# Patient Record
Sex: FEMALE | Race: WHITE | NOT HISPANIC OR LATINO | Employment: STUDENT | URBAN - METROPOLITAN AREA
[De-identification: names, ages, dates, MRNs, and addresses within clinical notes are randomized per-mention and may not be internally consistent; named-entity substitution may affect disease eponyms.]

---
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
## 2022-03-25 ENCOUNTER — TELEPHONE (OUTPATIENT)
Dept: FAMILY MEDICINE | Facility: CLINIC | Age: 22
End: 2022-03-25
Payer: COMMERCIAL

## 2022-03-25 NOTE — TELEPHONE ENCOUNTER
Left message on voice mail to call back. To confirm appointment on 03/29/2022 @ 2:20 and to bring any mediation bottles that she is taken and that she needs to wear mask in the office.

## 2022-03-29 ENCOUNTER — OFFICE VISIT (OUTPATIENT)
Dept: FAMILY MEDICINE | Facility: CLINIC | Age: 22
End: 2022-03-29
Payer: COMMERCIAL

## 2022-03-29 VITALS
HEART RATE: 84 BPM | WEIGHT: 158 LBS | DIASTOLIC BLOOD PRESSURE: 82 MMHG | SYSTOLIC BLOOD PRESSURE: 136 MMHG | BODY MASS INDEX: 31.85 KG/M2 | HEIGHT: 59 IN | OXYGEN SATURATION: 99 %

## 2022-03-29 DIAGNOSIS — F41.9 ANXIETY: ICD-10-CM

## 2022-03-29 DIAGNOSIS — Z00.00 ROUTINE PHYSICAL EXAMINATION: Primary | ICD-10-CM

## 2022-03-29 DIAGNOSIS — F51.01 PRIMARY INSOMNIA: ICD-10-CM

## 2022-03-29 PROCEDURE — 1160F PR REVIEW ALL MEDS BY PRESCRIBER/CLIN PHARMACIST DOCUMENTED: ICD-10-PCS | Mod: S$GLB,,, | Performed by: PHYSICIAN ASSISTANT

## 2022-03-29 PROCEDURE — 99204 PR OFFICE/OUTPT VISIT, NEW, LEVL IV, 45-59 MIN: ICD-10-PCS | Mod: S$GLB,,, | Performed by: PHYSICIAN ASSISTANT

## 2022-03-29 PROCEDURE — 3008F BODY MASS INDEX DOCD: CPT | Mod: S$GLB,,, | Performed by: PHYSICIAN ASSISTANT

## 2022-03-29 PROCEDURE — 3008F PR BODY MASS INDEX (BMI) DOCUMENTED: ICD-10-PCS | Mod: S$GLB,,, | Performed by: PHYSICIAN ASSISTANT

## 2022-03-29 PROCEDURE — 99204 OFFICE O/P NEW MOD 45 MIN: CPT | Mod: S$GLB,,, | Performed by: PHYSICIAN ASSISTANT

## 2022-03-29 PROCEDURE — 1160F RVW MEDS BY RX/DR IN RCRD: CPT | Mod: S$GLB,,, | Performed by: PHYSICIAN ASSISTANT

## 2022-03-29 RX ORDER — DEXTROAMPHETAMINE SACCHARATE, AMPHETAMINE ASPARTATE, DEXTROAMPHETAMINE SULFATE AND AMPHETAMINE SULFATE 7.5; 7.5; 7.5; 7.5 MG/1; MG/1; MG/1; MG/1
30 TABLET ORAL 2 TIMES DAILY
Refills: 0 | COMMUNITY
Start: 2022-02-10 | End: 2022-05-10 | Stop reason: SDUPTHER

## 2022-03-29 RX ORDER — SUVOREXANT 10 MG/1
10 TABLET, FILM COATED ORAL NIGHTLY
Qty: 10 TABLET | Refills: 0 | Status: SHIPPED | OUTPATIENT
Start: 2022-03-29 | End: 2022-04-08

## 2022-03-29 RX ORDER — ESCITALOPRAM OXALATE 5 MG/1
5 TABLET ORAL DAILY
Qty: 30 TABLET | Refills: 5 | Status: SHIPPED | OUTPATIENT
Start: 2022-03-29 | End: 2022-05-10 | Stop reason: SDUPTHER

## 2022-03-29 RX ORDER — HYDROXYZINE HYDROCHLORIDE 50 MG/1
50 TABLET, FILM COATED ORAL NIGHTLY
COMMUNITY
Start: 2022-02-10 | End: 2022-11-08

## 2022-03-29 NOTE — PROGRESS NOTES
"  SUBJECTIVE:    Patient ID: Nasima Lemus is a 21 y.o. female.    Chief Complaint: Establish Care (New patient to establish care for ADHD and primary care//brought med bottles//tc)    This is a 20 yo wf presenting as a NP to be established with our practice. Previous PCP is pediatrician, Dr. Padron. Her PMH, PSH and FH has been taken and recorded in the chart. She is in school pursuing a degree in psychology. She hopes to proceed through and uktimately her get phD in psych. I see a few basic labs from the past few years UC and ER visits but nothing extensive. She reports doing well overall. Adderall is effective at the current dose. Only issue is sleep. She "cannot shut her mind off" has tried a multitude of sleep medications including trazodone, seroquel, hydroxyzine, clonidine all with little benefit or intolerable for pt. She reports a distant hx of OCD tendencies and has been placed on prozac in the past. Remembers not liking the way it made her feel.       No visits with results within 6 Month(s) from this visit.   Latest known visit with results is:   Admission on 09/20/2021, Discharged on 09/20/2021   Component Date Value Ref Range Status    WBC 09/20/2021 13.77 (A) 3.90 - 12.70 K/uL Final    RBC 09/20/2021 5.33  4.00 - 5.40 M/uL Final    Hemoglobin 09/20/2021 14.6  12.0 - 16.0 g/dL Final    Hematocrit 09/20/2021 44.9  37.0 - 48.5 % Final    MCV 09/20/2021 84  82 - 98 fL Final    MCH 09/20/2021 27.4  27.0 - 31.0 pg Final    MCHC 09/20/2021 32.5  32.0 - 36.0 g/dL Final    RDW 09/20/2021 12.9  11.5 - 14.5 % Final    Platelets 09/20/2021 336  150 - 450 K/uL Final    MPV 09/20/2021 9.3  9.2 - 12.9 fL Final    Immature Granulocytes 09/20/2021 0.3  0.0 - 0.5 % Final    Gran # (ANC) 09/20/2021 12.1 (A) 1.8 - 7.7 K/uL Final    Immature Grans (Abs) 09/20/2021 0.04  0.00 - 0.04 K/uL Final    Lymph # 09/20/2021 1.2  1.0 - 4.8 K/uL Final    Mono # 09/20/2021 0.3  0.3 - 1.0 K/uL Final    Eos # " 09/20/2021 0.0  0.0 - 0.5 K/uL Final    Baso # 09/20/2021 0.03  0.00 - 0.20 K/uL Final    nRBC 09/20/2021 0  0 /100 WBC Final    Gran % 09/20/2021 88.2 (A) 38.0 - 73.0 % Final    Lymph % 09/20/2021 8.6 (A) 18.0 - 48.0 % Final    Mono % 09/20/2021 2.5 (A) 4.0 - 15.0 % Final    Eosinophil % 09/20/2021 0.2  0.0 - 8.0 % Final    Basophil % 09/20/2021 0.2  0.0 - 1.9 % Final    Differential Method 09/20/2021 Automated   Final    Sodium 09/20/2021 137  136 - 145 mmol/L Final    Potassium 09/20/2021 4.2  3.5 - 5.1 mmol/L Final    Chloride 09/20/2021 105  95 - 110 mmol/L Final    CO2 09/20/2021 20 (A) 23 - 29 mmol/L Final    Glucose 09/20/2021 97  70 - 110 mg/dL Final    BUN 09/20/2021 11  6 - 20 mg/dL Final    Creatinine 09/20/2021 0.8  0.5 - 1.4 mg/dL Final    Calcium 09/20/2021 9.7  8.7 - 10.5 mg/dL Final    Anion Gap 09/20/2021 12  8 - 16 mmol/L Final    eGFR if African American 09/20/2021 >60  >60 mL/min/1.73 m^2 Final    eGFR if non African American 09/20/2021 >60  >60 mL/min/1.73 m^2 Final    Specimen UA 09/20/2021 Urine, Clean Catch   Final    Color, UA 09/20/2021 Yellow  Yellow, Straw, Chanel Final    Appearance, UA 09/20/2021 Clear  Clear Final    pH, UA 09/20/2021 6.0  5.0 - 8.0 Final    Specific Gravity, UA 09/20/2021 >=1.030 (A) 1.005 - 1.030 Final    Protein, UA 09/20/2021 Negative  Negative Final    Glucose, UA 09/20/2021 Negative  Negative Final    Ketones, UA 09/20/2021 1+ (A) Negative Final    Bilirubin (UA) 09/20/2021 1+ (A) Negative Final    Occult Blood UA 09/20/2021 Negative  Negative Final    Nitrite, UA 09/20/2021 Negative  Negative Final    Urobilinogen, UA 09/20/2021 Negative  <2.0 EU/dL Final    Leukocytes, UA 09/20/2021 Negative  Negative Final    POC Preg Test, Ur 09/20/2021 Negative  Negative Final     Acceptable 09/20/2021 Yes   Final    Chlamydia, Amplified DNA 09/20/2021 Not Detected  Not Detected Final    N gonorrhoeae, amplified DNA  09/20/2021 Not Detected  Not Detected Final    Trichomonas 09/20/2021 None  None Final    Clue Cells 09/20/2021 None  None Final    Budding Yeast 09/20/2021 None  None Final    Fungal Hyphae 09/20/2021 None  None Final    WBC - Vaginal Screen 09/20/2021 Rare (A) None Final    Bacteria - Vaginal Screen 09/20/2021 None  None Final    Wet Prep Source 09/20/2021 Vagina   Final       Past Medical History:   Diagnosis Date    ADHD (attention deficit hyperactivity disorder)     Anxiety     Depression     Primary insomnia      Past Surgical History:   Procedure Laterality Date    APPENDECTOMY       Family History   Problem Relation Age of Onset    Cancer Mother     Alcohol abuse Father     Depression Father     No Known Problems Sister     Cancer Paternal Grandfather         prostate       Marital Status: Single  Alcohol History:  reports previous alcohol use.  Tobacco History:  reports that she has never smoked. She has never used smokeless tobacco.  Drug History:  reports no history of drug use.    Review of patient's allergies indicates:  No Known Allergies    Current Outpatient Medications:     dextroamphetamine-amphetamine 30 mg Tab, Take 30 mg by mouth 2 (two) times a day., Disp: , Rfl: 0    EScitalopram oxalate (LEXAPRO) 5 MG Tab, Take 1 tablet (5 mg total) by mouth once daily., Disp: 30 tablet, Rfl: 5    ETONOGESTREL-ETHINYL ESTRADIOL VAGL, Place vaginally., Disp: , Rfl:     hydrOXYzine (ATARAX) 50 MG tablet, Take 50 mg by mouth every evening., Disp: , Rfl:     suvorexant (BELSOMRA) 10 mg Tab, Take 10 mg by mouth every evening. for 10 days, Disp: 10 tablet, Rfl: 0    Review of Systems   Constitutional: Negative for appetite change, chills, fatigue, fever and unexpected weight change.   HENT: Negative for congestion.    Respiratory: Negative for cough, chest tightness and shortness of breath.    Cardiovascular: Negative for chest pain and palpitations.   Gastrointestinal: Negative for abdominal  "distention and abdominal pain.   Endocrine: Negative for cold intolerance and heat intolerance.   Genitourinary: Negative for difficulty urinating and dysuria.   Musculoskeletal: Negative for arthralgias and back pain.   Neurological: Negative for dizziness, weakness and headaches.   Psychiatric/Behavioral: Positive for dysphoric mood and sleep disturbance.          Objective:      Vitals:    03/29/22 1406   BP: 136/82   Pulse: 84   SpO2: 99%   Weight: 71.7 kg (158 lb)   Height: 4' 11" (1.499 m)     Physical Exam  Constitutional:       General: She is not in acute distress.     Appearance: She is well-developed.   HENT:      Head: Normocephalic and atraumatic.   Eyes:      Conjunctiva/sclera: Conjunctivae normal.      Pupils: Pupils are equal, round, and reactive to light.   Neck:      Thyroid: No thyromegaly.   Cardiovascular:      Rate and Rhythm: Normal rate and regular rhythm.      Heart sounds: Normal heart sounds.   Pulmonary:      Effort: Pulmonary effort is normal.      Breath sounds: Normal breath sounds.   Abdominal:      General: Bowel sounds are normal. There is no distension.      Palpations: Abdomen is soft.      Tenderness: There is no abdominal tenderness.   Musculoskeletal:         General: Normal range of motion.      Cervical back: Normal range of motion and neck supple.   Skin:     General: Skin is warm and dry.      Findings: No erythema.   Neurological:      Mental Status: She is alert and oriented to person, place, and time.      Cranial Nerves: No cranial nerve deficit.           Assessment:       1. Routine physical examination    2. Primary insomnia    3. Anxiety         Plan:       Routine physical examination  Comments:  very healthy individual. Labs today to get her established. UTD on all screening measures.  Orders:  -     CBC Auto Differential; Future; Expected date: 03/29/2022  -     Comprehensive Metabolic Panel; Future; Expected date: 03/29/2022  -     Lipid Panel; Future; Expected " date: 03/29/2022  -     TSH w/reflex to FT4; Future; Expected date: 03/29/2022  -     Urinalysis, Reflex to Urine Culture Urine, Clean Catch  -     HIV 1/2 Ag/Ab (4th Gen); Future; Expected date: 03/29/2022  -     Hepatitis C Antibody; Future; Expected date: 03/29/2022    Primary insomnia  Comments:  going to trial with belsomra and see how she does. coupon card given.  Orders:  -     suvorexant (BELSOMRA) 10 mg Tab; Take 10 mg by mouth every evening. for 10 days  Dispense: 10 tablet; Refill: 0    Anxiety  Comments:  low dose lexapro to try as SE of prozac were too much for her.  Orders:  -     EScitalopram oxalate (LEXAPRO) 5 MG Tab; Take 1 tablet (5 mg total) by mouth once daily.  Dispense: 30 tablet; Refill: 5      Follow up in about 6 weeks (around 5/10/2022).        3/29/2022 Simone Crews PA-C

## 2022-03-31 ENCOUNTER — TELEPHONE (OUTPATIENT)
Dept: FAMILY MEDICINE | Facility: CLINIC | Age: 22
End: 2022-03-31
Payer: COMMERCIAL

## 2022-03-31 NOTE — TELEPHONE ENCOUNTER
Spoke with pharmacy gave verbal order to change to 30, they stated she will need to call them and give the coupon number to see if it covers the whole box first or if the ins company needs to be billed first.

## 2022-03-31 NOTE — TELEPHONE ENCOUNTER
Lets call and see if they will dispense the box for her. It is coupon card from the company for a free trial. Should be allowed to break or give entire box of 30. If not then PA is fine. She has tried and failed 4 other medications. Clonidine, trazodone, mirtazepine, prozac, hydroxyzine.

## 2022-04-02 LAB
ALBUMIN SERPL-MCNC: 4.1 G/DL (ref 3.6–5.1)
ALBUMIN/GLOB SERPL: 1.6 (CALC) (ref 1–2.5)
ALP SERPL-CCNC: 86 U/L (ref 36–128)
ALT SERPL-CCNC: 13 U/L (ref 5–32)
AST SERPL-CCNC: 14 U/L (ref 12–32)
BASOPHILS # BLD AUTO: 23 CELLS/UL (ref 0–200)
BASOPHILS NFR BLD AUTO: 0.3 %
BILIRUB SERPL-MCNC: 0.7 MG/DL (ref 0.2–1.1)
BUN SERPL-MCNC: 11 MG/DL (ref 7–20)
BUN/CREAT SERPL: NORMAL (CALC) (ref 6–22)
CALCIUM SERPL-MCNC: 8.9 MG/DL (ref 8.9–10.4)
CHLORIDE SERPL-SCNC: 105 MMOL/L (ref 98–110)
CHOLEST SERPL-MCNC: 197 MG/DL
CHOLEST/HDLC SERPL: 3.5 (CALC)
CO2 SERPL-SCNC: 26 MMOL/L (ref 20–32)
CREAT SERPL-MCNC: 0.69 MG/DL (ref 0.5–1)
EOSINOPHIL # BLD AUTO: 146 CELLS/UL (ref 15–500)
EOSINOPHIL NFR BLD AUTO: 1.9 %
ERYTHROCYTE [DISTWIDTH] IN BLOOD BY AUTOMATED COUNT: 13.4 % (ref 11–15)
GLOBULIN SER CALC-MCNC: 2.5 G/DL (CALC) (ref 2–3.8)
GLUCOSE SERPL-MCNC: 84 MG/DL (ref 65–99)
HCT VFR BLD AUTO: 41.7 % (ref 35–45)
HCV AB S/CO SERPL IA: 0
HCV AB SERPL QL IA: NORMAL
HDLC SERPL-MCNC: 57 MG/DL
HGB BLD-MCNC: 13.4 G/DL (ref 11.7–15.5)
HIV 1+2 AB+HIV1 P24 AG SERPL QL IA: NORMAL
LDLC SERPL CALC-MCNC: 110 MG/DL (CALC)
LYMPHOCYTES # BLD AUTO: 2379 CELLS/UL (ref 850–3900)
LYMPHOCYTES NFR BLD AUTO: 30.9 %
MCH RBC QN AUTO: 27.1 PG (ref 27–33)
MCHC RBC AUTO-ENTMCNC: 32.1 G/DL (ref 32–36)
MCV RBC AUTO: 84.2 FL (ref 80–100)
MONOCYTES # BLD AUTO: 647 CELLS/UL (ref 200–950)
MONOCYTES NFR BLD AUTO: 8.4 %
NEUTROPHILS # BLD AUTO: 4505 CELLS/UL (ref 1500–7800)
NEUTROPHILS NFR BLD AUTO: 58.5 %
NONHDLC SERPL-MCNC: 140 MG/DL (CALC)
PLATELET # BLD AUTO: 349 THOUSAND/UL (ref 140–400)
PMV BLD REES-ECKER: 9.4 FL (ref 7.5–12.5)
POTASSIUM SERPL-SCNC: 4.3 MMOL/L (ref 3.8–5.1)
PROT SERPL-MCNC: 6.6 G/DL (ref 6.3–8.2)
RBC # BLD AUTO: 4.95 MILLION/UL (ref 3.8–5.1)
SODIUM SERPL-SCNC: 139 MMOL/L (ref 135–146)
TRIGL SERPL-MCNC: 186 MG/DL
TSH SERPL-ACNC: 2.17 MIU/L
WBC # BLD AUTO: 7.7 THOUSAND/UL (ref 3.8–10.8)

## 2022-04-13 ENCOUNTER — TELEPHONE (OUTPATIENT)
Dept: FAMILY MEDICINE | Facility: CLINIC | Age: 22
End: 2022-04-13

## 2022-04-13 DIAGNOSIS — F51.01 PRIMARY INSOMNIA: Primary | ICD-10-CM

## 2022-04-13 RX ORDER — SUVOREXANT 10 MG/1
1 TABLET, FILM COATED ORAL NIGHTLY PRN
Qty: 30 TABLET | Refills: 5 | Status: SHIPPED | OUTPATIENT
Start: 2022-04-13 | End: 2022-05-10

## 2022-04-13 NOTE — TELEPHONE ENCOUNTER
Pharmacy needs PA on Belsomra, per Jose Martin's last telephone encounter. OK for PA      She has tried and failed 4 other medications. Clonidine, trazodone, mirtazepine, prozac, hydroxyzine.

## 2022-04-13 NOTE — TELEPHONE ENCOUNTER
You can say that she has a contraindication to ambien. And lunesta. These are not safe alternatives for her

## 2022-05-10 ENCOUNTER — OFFICE VISIT (OUTPATIENT)
Dept: FAMILY MEDICINE | Facility: CLINIC | Age: 22
End: 2022-05-10
Payer: COMMERCIAL

## 2022-05-10 VITALS
HEIGHT: 59 IN | DIASTOLIC BLOOD PRESSURE: 78 MMHG | BODY MASS INDEX: 31.65 KG/M2 | HEART RATE: 82 BPM | SYSTOLIC BLOOD PRESSURE: 120 MMHG | WEIGHT: 157 LBS

## 2022-05-10 DIAGNOSIS — F41.9 ANXIETY: ICD-10-CM

## 2022-05-10 DIAGNOSIS — F90.0 ATTENTION DEFICIT HYPERACTIVITY DISORDER (ADHD), PREDOMINANTLY INATTENTIVE TYPE: ICD-10-CM

## 2022-05-10 DIAGNOSIS — Z79.899 ENCOUNTER FOR LONG-TERM (CURRENT) USE OF OTHER MEDICATIONS: ICD-10-CM

## 2022-05-10 DIAGNOSIS — Z51.81 ENCOUNTER FOR THERAPEUTIC DRUG MONITORING: ICD-10-CM

## 2022-05-10 DIAGNOSIS — F51.01 PRIMARY INSOMNIA: Primary | ICD-10-CM

## 2022-05-10 PROCEDURE — 3008F PR BODY MASS INDEX (BMI) DOCUMENTED: ICD-10-PCS | Mod: CPTII,S$GLB,, | Performed by: PHYSICIAN ASSISTANT

## 2022-05-10 PROCEDURE — 3074F SYST BP LT 130 MM HG: CPT | Mod: CPTII,S$GLB,, | Performed by: PHYSICIAN ASSISTANT

## 2022-05-10 PROCEDURE — 3074F PR MOST RECENT SYSTOLIC BLOOD PRESSURE < 130 MM HG: ICD-10-PCS | Mod: CPTII,S$GLB,, | Performed by: PHYSICIAN ASSISTANT

## 2022-05-10 PROCEDURE — 3008F BODY MASS INDEX DOCD: CPT | Mod: CPTII,S$GLB,, | Performed by: PHYSICIAN ASSISTANT

## 2022-05-10 PROCEDURE — 99214 OFFICE O/P EST MOD 30 MIN: CPT | Mod: S$GLB,,, | Performed by: PHYSICIAN ASSISTANT

## 2022-05-10 PROCEDURE — 1159F MED LIST DOCD IN RCRD: CPT | Mod: CPTII,S$GLB,, | Performed by: PHYSICIAN ASSISTANT

## 2022-05-10 PROCEDURE — 1159F PR MEDICATION LIST DOCUMENTED IN MEDICAL RECORD: ICD-10-PCS | Mod: CPTII,S$GLB,, | Performed by: PHYSICIAN ASSISTANT

## 2022-05-10 PROCEDURE — 99214 PR OFFICE/OUTPT VISIT, EST, LEVL IV, 30-39 MIN: ICD-10-PCS | Mod: S$GLB,,, | Performed by: PHYSICIAN ASSISTANT

## 2022-05-10 PROCEDURE — 3078F DIAST BP <80 MM HG: CPT | Mod: CPTII,S$GLB,, | Performed by: PHYSICIAN ASSISTANT

## 2022-05-10 PROCEDURE — 3078F PR MOST RECENT DIASTOLIC BLOOD PRESSURE < 80 MM HG: ICD-10-PCS | Mod: CPTII,S$GLB,, | Performed by: PHYSICIAN ASSISTANT

## 2022-05-10 RX ORDER — DEXTROAMPHETAMINE SACCHARATE, AMPHETAMINE ASPARTATE, DEXTROAMPHETAMINE SULFATE AND AMPHETAMINE SULFATE 7.5; 7.5; 7.5; 7.5 MG/1; MG/1; MG/1; MG/1
30 TABLET ORAL 2 TIMES DAILY
Qty: 60 TABLET | Refills: 0 | Status: SHIPPED | OUTPATIENT
Start: 2022-05-10 | End: 2022-06-09

## 2022-05-10 RX ORDER — ESCITALOPRAM OXALATE 10 MG/1
10 TABLET ORAL DAILY
Qty: 30 TABLET | Refills: 5 | Status: SHIPPED | OUTPATIENT
Start: 2022-05-10 | End: 2022-06-14 | Stop reason: SDUPTHER

## 2022-05-10 RX ORDER — DEXTROAMPHETAMINE SACCHARATE, AMPHETAMINE ASPARTATE, DEXTROAMPHETAMINE SULFATE AND AMPHETAMINE SULFATE 7.5; 7.5; 7.5; 7.5 MG/1; MG/1; MG/1; MG/1
30 TABLET ORAL 2 TIMES DAILY
Qty: 60 TABLET | Refills: 0 | Status: SHIPPED | OUTPATIENT
Start: 2022-06-10 | End: 2022-07-10

## 2022-05-10 RX ORDER — DEXTROAMPHETAMINE SACCHARATE, AMPHETAMINE ASPARTATE, DEXTROAMPHETAMINE SULFATE AND AMPHETAMINE SULFATE 7.5; 7.5; 7.5; 7.5 MG/1; MG/1; MG/1; MG/1
30 TABLET ORAL 2 TIMES DAILY
Qty: 60 TABLET | Refills: 0 | Status: SHIPPED | OUTPATIENT
Start: 2022-07-10 | End: 2022-08-17 | Stop reason: SDUPTHER

## 2022-05-10 RX ORDER — ZOLPIDEM TARTRATE 5 MG/1
5 TABLET ORAL NIGHTLY PRN
Qty: 30 TABLET | Refills: 2 | Status: SHIPPED | OUTPATIENT
Start: 2022-05-10 | End: 2022-06-07

## 2022-05-10 NOTE — PROGRESS NOTES
SUBJECTIVE:    Patient ID: Nasima Lemus is a 21 y.o. female.    Chief Complaint: Follow-up (6 week f/u for medication check//no med bottles//tc)    Very pleasant 21-year-old female presents today for 6 week follow-up regarding medications.  Full labs were completed at the last visit and all stable.  Cholesterol indicated mild elevation of triglycerides.  I did start the patient on low-dose Lexapro and Belsomra. HUGE improvement with the sleep but she was not able to afford the belsomra through insurance. New job and really struggling with the sleep issue. Tired during the day. Does not get more than a few hours sleep each night. Mind racing.       Office Visit on 03/29/2022   Component Date Value Ref Range Status    WBC 03/31/2022 7.7  3.8 - 10.8 Thousand/uL Final    RBC 03/31/2022 4.95  3.80 - 5.10 Million/uL Final    Hemoglobin 03/31/2022 13.4  11.7 - 15.5 g/dL Final    Hematocrit 03/31/2022 41.7  35.0 - 45.0 % Final    MCV 03/31/2022 84.2  80.0 - 100.0 fL Final    MCH 03/31/2022 27.1  27.0 - 33.0 pg Final    MCHC 03/31/2022 32.1  32.0 - 36.0 g/dL Final    RDW 03/31/2022 13.4  11.0 - 15.0 % Final    Platelets 03/31/2022 349  140 - 400 Thousand/uL Final    MPV 03/31/2022 9.4  7.5 - 12.5 fL Final    Neutrophils, Abs 03/31/2022 4,505  1,500 - 7,800 cells/uL Final    Lymph # 03/31/2022 2,379  850 - 3,900 cells/uL Final    Mono # 03/31/2022 647  200 - 950 cells/uL Final    Eos # 03/31/2022 146  15 - 500 cells/uL Final    Baso # 03/31/2022 23  0 - 200 cells/uL Final    Neutrophils Relative 03/31/2022 58.5  % Final    Lymph % 03/31/2022 30.9  % Final    Mono % 03/31/2022 8.4  % Final    Eosinophil % 03/31/2022 1.9  % Final    Basophil % 03/31/2022 0.3  % Final    Glucose 03/31/2022 84  65 - 99 mg/dL Final    BUN 03/31/2022 11  7 - 20 mg/dL Final    Creatinine 03/31/2022 0.69  0.50 - 1.00 mg/dL Final    eGFR if non African American 03/31/2022 126  > OR = 60 mL/min/1.73m2 Final    eGFR if   03/31/2022 146  > OR = 60 mL/min/1.73m2 Final    BUN/Creatinine Ratio 03/31/2022 NOT APPLICABLE  6 - 22 (calc) Final    Sodium 03/31/2022 139  135 - 146 mmol/L Final    Potassium 03/31/2022 4.3  3.8 - 5.1 mmol/L Final    Chloride 03/31/2022 105  98 - 110 mmol/L Final    CO2 03/31/2022 26  20 - 32 mmol/L Final    Calcium 03/31/2022 8.9  8.9 - 10.4 mg/dL Final    Total Protein 03/31/2022 6.6  6.3 - 8.2 g/dL Final    Albumin 03/31/2022 4.1  3.6 - 5.1 g/dL Final    Globulin, Total 03/31/2022 2.5  2.0 - 3.8 g/dL (calc) Final    Albumin/Globulin Ratio 03/31/2022 1.6  1.0 - 2.5 (calc) Final    Total Bilirubin 03/31/2022 0.7  0.2 - 1.1 mg/dL Final    Alkaline Phosphatase 03/31/2022 86  36 - 128 U/L Final    AST 03/31/2022 14  12 - 32 U/L Final    ALT 03/31/2022 13  5 - 32 U/L Final    Cholesterol 03/31/2022 197 (A) <170 mg/dL Final    HDL 03/31/2022 57  >45 mg/dL Final    Triglycerides 03/31/2022 186 (A) <90 mg/dL Final    LDL Cholesterol 03/31/2022 110 (A) <110 mg/dL (calc) Final    HDL/Cholesterol Ratio 03/31/2022 3.5  <5.0 (calc) Final    Non HDL Chol. (LDL+VLDL) 03/31/2022 140 (A) <120 mg/dL (calc) Final    TSH w/reflex to FT4 03/31/2022 2.17  mIU/L Final    HIV Ag/Ab 4th Gen 03/31/2022 NON-REACTIVE  NON-REACTIVE Final    Hepatitis C Ab 03/31/2022 NON-REACTIVE  NON-REACTIVE Final    Signal/Cutoff 03/31/2022 0.00  <1.00 Final       Past Medical History:   Diagnosis Date    ADHD (attention deficit hyperactivity disorder)     Anxiety     Depression     Primary insomnia      Past Surgical History:   Procedure Laterality Date    APPENDECTOMY       Family History   Problem Relation Age of Onset    Cancer Mother     Alcohol abuse Father     Depression Father     No Known Problems Sister     Cancer Paternal Grandfather         prostate       Marital Status: Single  Alcohol History:  reports previous alcohol use.  Tobacco History:  reports that she has never smoked. She has  "never used smokeless tobacco.  Drug History:  reports no history of drug use.    Review of patient's allergies indicates:  No Known Allergies    Current Outpatient Medications:     dextroamphetamine-amphetamine 30 mg Tab, Take 1 tablet (30 mg total) by mouth 2 (two) times a day., Disp: 60 tablet, Rfl: 0    [START ON 6/10/2022] dextroamphetamine-amphetamine 30 mg Tab, Take 1 tablet (30 mg total) by mouth 2 (two) times a day., Disp: 60 tablet, Rfl: 0    [START ON 7/10/2022] dextroamphetamine-amphetamine 30 mg Tab, Take 1 tablet (30 mg total) by mouth 2 (two) times a day., Disp: 60 tablet, Rfl: 0    EScitalopram oxalate (LEXAPRO) 10 MG tablet, Take 1 tablet (10 mg total) by mouth once daily., Disp: 30 tablet, Rfl: 5    ETONOGESTREL-ETHINYL ESTRADIOL VAGL, Place vaginally., Disp: , Rfl:     hydrOXYzine (ATARAX) 50 MG tablet, Take 50 mg by mouth every evening., Disp: , Rfl:     zolpidem (AMBIEN) 5 MG Tab, Take 1 tablet (5 mg total) by mouth nightly as needed (insomnia)., Disp: 30 tablet, Rfl: 2    Review of Systems   Constitutional: Negative for appetite change, chills, fatigue, fever and unexpected weight change.   HENT: Negative for congestion.    Respiratory: Negative for cough, chest tightness and shortness of breath.    Cardiovascular: Negative for chest pain and palpitations.   Gastrointestinal: Negative for abdominal distention and abdominal pain.   Endocrine: Negative for cold intolerance and heat intolerance.   Genitourinary: Negative for difficulty urinating and dysuria.   Musculoskeletal: Negative for arthralgias and back pain.   Neurological: Negative for dizziness, weakness and headaches.   Psychiatric/Behavioral: Positive for dysphoric mood and sleep disturbance.          Objective:      Vitals:    05/10/22 0738   BP: 120/78   Pulse: 82   Weight: 71.2 kg (157 lb)   Height: 4' 11" (1.499 m)     Physical Exam  Constitutional:       General: She is not in acute distress.     Appearance: She is " well-developed.   HENT:      Head: Normocephalic and atraumatic.   Eyes:      Conjunctiva/sclera: Conjunctivae normal.      Pupils: Pupils are equal, round, and reactive to light.   Neck:      Thyroid: No thyromegaly.   Cardiovascular:      Rate and Rhythm: Normal rate and regular rhythm.      Heart sounds: Normal heart sounds.   Pulmonary:      Effort: Pulmonary effort is normal.      Breath sounds: Normal breath sounds.   Abdominal:      General: Bowel sounds are normal. There is no distension.      Palpations: Abdomen is soft.      Tenderness: There is no abdominal tenderness.   Musculoskeletal:         General: Normal range of motion.      Cervical back: Normal range of motion and neck supple.   Skin:     General: Skin is warm and dry.      Findings: No erythema.   Neurological:      Mental Status: She is alert and oriented to person, place, and time.      Cranial Nerves: No cranial nerve deficit.           Assessment:       1. Primary insomnia    2. Encounter for long-term (current) use of other medications    3. Encounter for therapeutic drug monitoring    4. Anxiety    5. Attention deficit hyperactivity disorder (ADHD), predominantly inattentive type         Plan:       Primary insomnia  Comments:  Terrible. Unable to tolerate previous options. Not sleeping well at all. trial with ambien prn.   Orders:  -     zolpidem (AMBIEN) 5 MG Tab; Take 1 tablet (5 mg total) by mouth nightly as needed (insomnia).  Dispense: 30 tablet; Refill: 2    Encounter for long-term (current) use of other medications  -     DRUG MONITOR, PANEL 4, W/CONF, URINE; Future; Expected date: 05/10/2022    Encounter for therapeutic drug monitoring  -     DRUG MONITOR, PANEL 4, W/CONF, URINE; Future; Expected date: 05/10/2022    Anxiety  Comments:  low dose lexapro to try as SE of prozac were too much for her.  Orders:  -     EScitalopram oxalate (LEXAPRO) 10 MG tablet; Take 1 tablet (10 mg total) by mouth once daily.  Dispense: 30 tablet;  Refill: 5    Attention deficit hyperactivity disorder (ADHD), predominantly inattentive type  Comments:  refills of medication now. Doing well. Taking over from another prvider.  Orders:  -     dextroamphetamine-amphetamine 30 mg Tab; Take 1 tablet (30 mg total) by mouth 2 (two) times a day.  Dispense: 60 tablet; Refill: 0  -     dextroamphetamine-amphetamine 30 mg Tab; Take 1 tablet (30 mg total) by mouth 2 (two) times a day.  Dispense: 60 tablet; Refill: 0  -     dextroamphetamine-amphetamine 30 mg Tab; Take 1 tablet (30 mg total) by mouth 2 (two) times a day.  Dispense: 60 tablet; Refill: 0      Follow up in about 3 months (around 8/10/2022).        5/10/2022 Simone Crews PA-C

## 2022-06-07 ENCOUNTER — OFFICE VISIT (OUTPATIENT)
Dept: FAMILY MEDICINE | Facility: CLINIC | Age: 22
End: 2022-06-07
Payer: COMMERCIAL

## 2022-06-07 VITALS
OXYGEN SATURATION: 99 % | DIASTOLIC BLOOD PRESSURE: 80 MMHG | HEART RATE: 70 BPM | SYSTOLIC BLOOD PRESSURE: 116 MMHG | BODY MASS INDEX: 31.32 KG/M2 | WEIGHT: 155.38 LBS | HEIGHT: 59 IN

## 2022-06-07 DIAGNOSIS — F51.01 PRIMARY INSOMNIA: Primary | ICD-10-CM

## 2022-06-07 DIAGNOSIS — H66.91 RIGHT OTITIS MEDIA, UNSPECIFIED OTITIS MEDIA TYPE: ICD-10-CM

## 2022-06-07 PROCEDURE — 99213 PR OFFICE/OUTPT VISIT, EST, LEVL III, 20-29 MIN: ICD-10-PCS | Mod: S$GLB,,, | Performed by: PHYSICIAN ASSISTANT

## 2022-06-07 PROCEDURE — 99213 OFFICE O/P EST LOW 20 MIN: CPT | Mod: S$GLB,,, | Performed by: PHYSICIAN ASSISTANT

## 2022-06-07 PROCEDURE — 1159F MED LIST DOCD IN RCRD: CPT | Mod: CPTII,S$GLB,, | Performed by: PHYSICIAN ASSISTANT

## 2022-06-07 PROCEDURE — 3074F SYST BP LT 130 MM HG: CPT | Mod: CPTII,S$GLB,, | Performed by: PHYSICIAN ASSISTANT

## 2022-06-07 PROCEDURE — 1159F PR MEDICATION LIST DOCUMENTED IN MEDICAL RECORD: ICD-10-PCS | Mod: CPTII,S$GLB,, | Performed by: PHYSICIAN ASSISTANT

## 2022-06-07 PROCEDURE — 3008F PR BODY MASS INDEX (BMI) DOCUMENTED: ICD-10-PCS | Mod: CPTII,S$GLB,, | Performed by: PHYSICIAN ASSISTANT

## 2022-06-07 PROCEDURE — 3074F PR MOST RECENT SYSTOLIC BLOOD PRESSURE < 130 MM HG: ICD-10-PCS | Mod: CPTII,S$GLB,, | Performed by: PHYSICIAN ASSISTANT

## 2022-06-07 PROCEDURE — 3079F PR MOST RECENT DIASTOLIC BLOOD PRESSURE 80-89 MM HG: ICD-10-PCS | Mod: CPTII,S$GLB,, | Performed by: PHYSICIAN ASSISTANT

## 2022-06-07 PROCEDURE — 3079F DIAST BP 80-89 MM HG: CPT | Mod: CPTII,S$GLB,, | Performed by: PHYSICIAN ASSISTANT

## 2022-06-07 PROCEDURE — 3008F BODY MASS INDEX DOCD: CPT | Mod: CPTII,S$GLB,, | Performed by: PHYSICIAN ASSISTANT

## 2022-06-07 RX ORDER — AMOXICILLIN AND CLAVULANATE POTASSIUM 875; 125 MG/1; MG/1
1 TABLET, FILM COATED ORAL EVERY 12 HOURS
Qty: 20 TABLET | Refills: 0 | Status: SHIPPED | OUTPATIENT
Start: 2022-06-07 | End: 2022-06-17

## 2022-06-07 RX ORDER — SUVOREXANT 10 MG/1
10 TABLET, FILM COATED ORAL NIGHTLY
Qty: 30 TABLET | Refills: 5 | Status: SHIPPED | OUTPATIENT
Start: 2022-06-07 | End: 2022-08-23 | Stop reason: SDUPTHER

## 2022-06-07 NOTE — PROGRESS NOTES
SUBJECTIVE:    Patient ID: Nasima Lemus is a 21 y.o. female.    Chief Complaint: Otalgia (No bottles/ right ear pain x 4 days//dp)    This is a 20 yo wf presenting today for urgent evaluation of RT ear pain. 4 days in duration. Denies any fever/chills. Denies drainage from the ear but has seen some reduced hearing. Feels like she has headphones on at all times. Muffled. Was seen at the  and prescribed abx but they did not end up sending them she says. Feels like a knife in the ear.      Office Visit on 03/29/2022   Component Date Value Ref Range Status    WBC 03/31/2022 7.7  3.8 - 10.8 Thousand/uL Final    RBC 03/31/2022 4.95  3.80 - 5.10 Million/uL Final    Hemoglobin 03/31/2022 13.4  11.7 - 15.5 g/dL Final    Hematocrit 03/31/2022 41.7  35.0 - 45.0 % Final    MCV 03/31/2022 84.2  80.0 - 100.0 fL Final    MCH 03/31/2022 27.1  27.0 - 33.0 pg Final    MCHC 03/31/2022 32.1  32.0 - 36.0 g/dL Final    RDW 03/31/2022 13.4  11.0 - 15.0 % Final    Platelets 03/31/2022 349  140 - 400 Thousand/uL Final    MPV 03/31/2022 9.4  7.5 - 12.5 fL Final    Neutrophils, Abs 03/31/2022 4,505  1,500 - 7,800 cells/uL Final    Lymph # 03/31/2022 2,379  850 - 3,900 cells/uL Final    Mono # 03/31/2022 647  200 - 950 cells/uL Final    Eos # 03/31/2022 146  15 - 500 cells/uL Final    Baso # 03/31/2022 23  0 - 200 cells/uL Final    Neutrophils Relative 03/31/2022 58.5  % Final    Lymph % 03/31/2022 30.9  % Final    Mono % 03/31/2022 8.4  % Final    Eosinophil % 03/31/2022 1.9  % Final    Basophil % 03/31/2022 0.3  % Final    Glucose 03/31/2022 84  65 - 99 mg/dL Final    BUN 03/31/2022 11  7 - 20 mg/dL Final    Creatinine 03/31/2022 0.69  0.50 - 1.00 mg/dL Final    eGFR if non African American 03/31/2022 126  > OR = 60 mL/min/1.73m2 Final    eGFR if  03/31/2022 146  > OR = 60 mL/min/1.73m2 Final    BUN/Creatinine Ratio 03/31/2022 NOT APPLICABLE  6 - 22 (calc) Final    Sodium 03/31/2022 139  135  - 146 mmol/L Final    Potassium 03/31/2022 4.3  3.8 - 5.1 mmol/L Final    Chloride 03/31/2022 105  98 - 110 mmol/L Final    CO2 03/31/2022 26  20 - 32 mmol/L Final    Calcium 03/31/2022 8.9  8.9 - 10.4 mg/dL Final    Total Protein 03/31/2022 6.6  6.3 - 8.2 g/dL Final    Albumin 03/31/2022 4.1  3.6 - 5.1 g/dL Final    Globulin, Total 03/31/2022 2.5  2.0 - 3.8 g/dL (calc) Final    Albumin/Globulin Ratio 03/31/2022 1.6  1.0 - 2.5 (calc) Final    Total Bilirubin 03/31/2022 0.7  0.2 - 1.1 mg/dL Final    Alkaline Phosphatase 03/31/2022 86  36 - 128 U/L Final    AST 03/31/2022 14  12 - 32 U/L Final    ALT 03/31/2022 13  5 - 32 U/L Final    Cholesterol 03/31/2022 197 (A) <170 mg/dL Final    HDL 03/31/2022 57  >45 mg/dL Final    Triglycerides 03/31/2022 186 (A) <90 mg/dL Final    LDL Cholesterol 03/31/2022 110 (A) <110 mg/dL (calc) Final    HDL/Cholesterol Ratio 03/31/2022 3.5  <5.0 (calc) Final    Non HDL Chol. (LDL+VLDL) 03/31/2022 140 (A) <120 mg/dL (calc) Final    TSH w/reflex to FT4 03/31/2022 2.17  mIU/L Final    HIV Ag/Ab 4th Gen 03/31/2022 NON-REACTIVE  NON-REACTIVE Final    Hepatitis C Ab 03/31/2022 NON-REACTIVE  NON-REACTIVE Final    Signal/Cutoff 03/31/2022 0.00  <1.00 Final       Past Medical History:   Diagnosis Date    ADHD (attention deficit hyperactivity disorder)     Anxiety     Depression     Primary insomnia      Past Surgical History:   Procedure Laterality Date    APPENDECTOMY       Family History   Problem Relation Age of Onset    Cancer Mother     Alcohol abuse Father     Depression Father     No Known Problems Sister     Cancer Paternal Grandfather         prostate       Marital Status: Single  Alcohol History:  reports previous alcohol use.  Tobacco History:  reports that she has never smoked. She has never used smokeless tobacco.  Drug History:  reports no history of drug use.    Review of patient's allergies indicates:  No Known Allergies    Current Outpatient  "Medications:     dextroamphetamine-amphetamine 30 mg Tab, Take 1 tablet (30 mg total) by mouth 2 (two) times a day., Disp: 60 tablet, Rfl: 0    [START ON 6/10/2022] dextroamphetamine-amphetamine 30 mg Tab, Take 1 tablet (30 mg total) by mouth 2 (two) times a day., Disp: 60 tablet, Rfl: 0    [START ON 7/10/2022] dextroamphetamine-amphetamine 30 mg Tab, Take 1 tablet (30 mg total) by mouth 2 (two) times a day., Disp: 60 tablet, Rfl: 0    EScitalopram oxalate (LEXAPRO) 10 MG tablet, Take 1 tablet (10 mg total) by mouth once daily., Disp: 30 tablet, Rfl: 5    ETONOGESTREL-ETHINYL ESTRADIOL VAGL, Place vaginally., Disp: , Rfl:     hydrOXYzine (ATARAX) 50 MG tablet, Take 50 mg by mouth every evening., Disp: , Rfl:     amoxicillin-clavulanate 875-125mg (AUGMENTIN) 875-125 mg per tablet, Take 1 tablet by mouth every 12 (twelve) hours. for 10 days, Disp: 20 tablet, Rfl: 0    suvorexant (BELSOMRA) 10 mg Tab, Take 10 mg by mouth every evening., Disp: 30 tablet, Rfl: 5    Review of Systems   HENT: Positive for congestion, ear pain, hearing loss (RT sided acute) and tinnitus. Negative for facial swelling.           Objective:      Vitals:    06/07/22 1204   BP: 116/80   Pulse: 70   SpO2: 99%   Weight: 70.5 kg (155 lb 6.4 oz)   Height: 4' 11" (1.499 m)     Physical Exam  Constitutional:       General: She is not in acute distress.     Appearance: She is well-developed.   HENT:      Head: Normocephalic and atraumatic.      Right Ear: Decreased hearing noted. Swelling present. Tympanic membrane is erythematous and bulging.   Eyes:      Conjunctiva/sclera: Conjunctivae normal.      Pupils: Pupils are equal, round, and reactive to light.   Neck:      Thyroid: No thyromegaly.   Cardiovascular:      Rate and Rhythm: Normal rate and regular rhythm.      Heart sounds: Normal heart sounds.   Pulmonary:      Effort: Pulmonary effort is normal.      Breath sounds: Normal breath sounds.   Abdominal:      General: Bowel sounds are " normal. There is no distension.      Palpations: Abdomen is soft.      Tenderness: There is no abdominal tenderness.   Musculoskeletal:         General: Normal range of motion.      Cervical back: Normal range of motion and neck supple.   Skin:     General: Skin is warm and dry.      Findings: No erythema.   Neurological:      Mental Status: She is alert and oriented to person, place, and time.      Cranial Nerves: No cranial nerve deficit.           Assessment:       1. Primary insomnia    2. Right otitis media, unspecified otitis media type         Plan:       Primary insomnia  Comments:  New insurance. Going to see if we can get the belsomra covered. erx sent.  Orders:  -     suvorexant (BELSOMRA) 10 mg Tab; Take 10 mg by mouth every evening.  Dispense: 30 tablet; Refill: 5    Right otitis media, unspecified otitis media type  Comments:  Cover with augmentin now. she will continue as is with prednisone. If sxs are worsening she will call  Orders:  -     amoxicillin-clavulanate 875-125mg (AUGMENTIN) 875-125 mg per tablet; Take 1 tablet by mouth every 12 (twelve) hours. for 10 days  Dispense: 20 tablet; Refill: 0      Follow up if symptoms worsen or fail to improve, for as scheduled.        6/7/2022 Simone Crews PA-C

## 2022-06-09 ENCOUNTER — PATIENT MESSAGE (OUTPATIENT)
Dept: FAMILY MEDICINE | Facility: CLINIC | Age: 22
End: 2022-06-09

## 2022-06-14 DIAGNOSIS — F41.9 ANXIETY: ICD-10-CM

## 2022-06-15 ENCOUNTER — PATIENT MESSAGE (OUTPATIENT)
Dept: FAMILY MEDICINE | Facility: CLINIC | Age: 22
End: 2022-06-15

## 2022-06-15 ENCOUNTER — TELEPHONE (OUTPATIENT)
Dept: FAMILY MEDICINE | Facility: CLINIC | Age: 22
End: 2022-06-15

## 2022-06-15 RX ORDER — ESCITALOPRAM OXALATE 10 MG/1
10 TABLET ORAL DAILY
Qty: 30 TABLET | Refills: 5 | Status: SHIPPED | OUTPATIENT
Start: 2022-06-15 | End: 2022-08-23 | Stop reason: SDUPTHER

## 2022-06-15 NOTE — TELEPHONE ENCOUNTER
Recommend starting Flonase daily 1 spray each nostril and finish out antibiotics.  If she continues with symptoms after antibiotics may need to see ENT

## 2022-08-23 DIAGNOSIS — F51.01 PRIMARY INSOMNIA: ICD-10-CM

## 2022-08-23 DIAGNOSIS — F41.9 ANXIETY: ICD-10-CM

## 2022-08-23 RX ORDER — ESCITALOPRAM OXALATE 10 MG/1
10 TABLET ORAL DAILY
Qty: 30 TABLET | Refills: 5 | Status: SHIPPED | OUTPATIENT
Start: 2022-08-23 | End: 2022-08-29 | Stop reason: SDUPTHER

## 2022-08-23 RX ORDER — SUVOREXANT 10 MG/1
10 TABLET, FILM COATED ORAL NIGHTLY
Qty: 30 TABLET | Refills: 5 | Status: SHIPPED | OUTPATIENT
Start: 2022-08-23 | End: 2022-08-29 | Stop reason: SDUPTHER

## 2022-08-29 DIAGNOSIS — F41.9 ANXIETY: ICD-10-CM

## 2022-08-29 DIAGNOSIS — F90.0 ATTENTION DEFICIT HYPERACTIVITY DISORDER (ADHD), PREDOMINANTLY INATTENTIVE TYPE: ICD-10-CM

## 2022-08-29 DIAGNOSIS — F51.01 PRIMARY INSOMNIA: ICD-10-CM

## 2022-08-29 RX ORDER — DEXTROAMPHETAMINE SACCHARATE, AMPHETAMINE ASPARTATE, DEXTROAMPHETAMINE SULFATE AND AMPHETAMINE SULFATE 7.5; 7.5; 7.5; 7.5 MG/1; MG/1; MG/1; MG/1
30 TABLET ORAL 2 TIMES DAILY
Qty: 60 TABLET | Refills: 0 | Status: SHIPPED | OUTPATIENT
Start: 2022-09-15 | End: 2022-10-15

## 2022-08-29 RX ORDER — ESCITALOPRAM OXALATE 10 MG/1
10 TABLET ORAL DAILY
Qty: 30 TABLET | Refills: 5 | Status: SHIPPED | OUTPATIENT
Start: 2022-08-29 | End: 2023-07-04 | Stop reason: SDUPTHER

## 2022-08-29 RX ORDER — SUVOREXANT 10 MG/1
10 TABLET, FILM COATED ORAL NIGHTLY
Qty: 30 TABLET | Refills: 5 | Status: SHIPPED | OUTPATIENT
Start: 2022-08-29 | End: 2023-02-25

## 2022-08-29 RX ORDER — DEXTROAMPHETAMINE SACCHARATE, AMPHETAMINE ASPARTATE, DEXTROAMPHETAMINE SULFATE AND AMPHETAMINE SULFATE 7.5; 7.5; 7.5; 7.5 MG/1; MG/1; MG/1; MG/1
30 TABLET ORAL 2 TIMES DAILY
Qty: 60 TABLET | Refills: 0 | Status: SHIPPED | OUTPATIENT
Start: 2022-10-15 | End: 2022-11-08 | Stop reason: SDUPTHER

## 2022-09-13 ENCOUNTER — OFFICE VISIT (OUTPATIENT)
Dept: FAMILY MEDICINE | Facility: CLINIC | Age: 22
End: 2022-09-13
Payer: COMMERCIAL

## 2022-09-13 VITALS
HEIGHT: 59 IN | OXYGEN SATURATION: 99 % | SYSTOLIC BLOOD PRESSURE: 142 MMHG | BODY MASS INDEX: 31.65 KG/M2 | HEART RATE: 110 BPM | WEIGHT: 157 LBS | DIASTOLIC BLOOD PRESSURE: 82 MMHG

## 2022-09-13 DIAGNOSIS — I10 ESSENTIAL HYPERTENSION: Primary | ICD-10-CM

## 2022-09-13 PROCEDURE — 3079F DIAST BP 80-89 MM HG: CPT | Mod: CPTII,S$GLB,, | Performed by: PHYSICIAN ASSISTANT

## 2022-09-13 PROCEDURE — 99213 PR OFFICE/OUTPT VISIT, EST, LEVL III, 20-29 MIN: ICD-10-PCS | Mod: S$GLB,,, | Performed by: PHYSICIAN ASSISTANT

## 2022-09-13 PROCEDURE — 1159F PR MEDICATION LIST DOCUMENTED IN MEDICAL RECORD: ICD-10-PCS | Mod: CPTII,S$GLB,, | Performed by: PHYSICIAN ASSISTANT

## 2022-09-13 PROCEDURE — 1159F MED LIST DOCD IN RCRD: CPT | Mod: CPTII,S$GLB,, | Performed by: PHYSICIAN ASSISTANT

## 2022-09-13 PROCEDURE — 3077F PR MOST RECENT SYSTOLIC BLOOD PRESSURE >= 140 MM HG: ICD-10-PCS | Mod: CPTII,S$GLB,, | Performed by: PHYSICIAN ASSISTANT

## 2022-09-13 PROCEDURE — 3079F PR MOST RECENT DIASTOLIC BLOOD PRESSURE 80-89 MM HG: ICD-10-PCS | Mod: CPTII,S$GLB,, | Performed by: PHYSICIAN ASSISTANT

## 2022-09-13 PROCEDURE — 3077F SYST BP >= 140 MM HG: CPT | Mod: CPTII,S$GLB,, | Performed by: PHYSICIAN ASSISTANT

## 2022-09-13 PROCEDURE — 99213 OFFICE O/P EST LOW 20 MIN: CPT | Mod: S$GLB,,, | Performed by: PHYSICIAN ASSISTANT

## 2022-09-13 PROCEDURE — 3008F PR BODY MASS INDEX (BMI) DOCUMENTED: ICD-10-PCS | Mod: CPTII,S$GLB,, | Performed by: PHYSICIAN ASSISTANT

## 2022-09-13 PROCEDURE — 3008F BODY MASS INDEX DOCD: CPT | Mod: CPTII,S$GLB,, | Performed by: PHYSICIAN ASSISTANT

## 2022-09-13 RX ORDER — LISINOPRIL 5 MG/1
5 TABLET ORAL DAILY
Qty: 30 TABLET | Refills: 2 | Status: SHIPPED | OUTPATIENT
Start: 2022-09-13 | End: 2022-10-05 | Stop reason: SDUPTHER

## 2022-09-13 NOTE — PROGRESS NOTES
SUBJECTIVE:    Patient ID: Nasima Lemus is a 21 y.o. female.    Chief Complaint: Follow-up (6 mo f/u//no med bottles//tc)    This is a 21-year-old female who presents today for 6 month follow-up.  She has noted recently pressure running a bit high at home.  150/110 on her at-home monitor.  She is treated for ADHD with Adderall effectively.  Never had a problem with pressure prior.  Has been on this for years.  She manages with the belsomra for sleep and Lexapro for anxiety/depression.  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 Occasional headache and flushing. Denies dizziness, blurry vision etc.      Office Visit on 03/29/2022   Component Date Value Ref Range Status    WBC 03/31/2022 7.7  3.8 - 10.8 Thousand/uL Final    RBC 03/31/2022 4.95  3.80 - 5.10 Million/uL Final    Hemoglobin 03/31/2022 13.4  11.7 - 15.5 g/dL Final    Hematocrit 03/31/2022 41.7  35.0 - 45.0 % Final    MCV 03/31/2022 84.2  80.0 - 100.0 fL Final    MCH 03/31/2022 27.1  27.0 - 33.0 pg Final    MCHC 03/31/2022 32.1  32.0 - 36.0 g/dL Final    RDW 03/31/2022 13.4  11.0 - 15.0 % Final    Platelets 03/31/2022 349  140 - 400 Thousand/uL Final    MPV 03/31/2022 9.4  7.5 - 12.5 fL Final    Neutrophils, Abs 03/31/2022 4,505  1,500 - 7,800 cells/uL Final    Lymph # 03/31/2022 2,379  850 - 3,900 cells/uL Final    Mono # 03/31/2022 647  200 - 950 cells/uL Final    Eos # 03/31/2022 146  15 - 500 cells/uL Final    Baso # 03/31/2022 23  0 - 200 cells/uL Final    Neutrophils Relative 03/31/2022 58.5  % Final    Lymph % 03/31/2022 30.9  % Final    Mono % 03/31/2022 8.4  % Final    Eosinophil % 03/31/2022 1.9  % Final    Basophil % 03/31/2022 0.3  % Final    Glucose 03/31/2022 84  65 - 99 mg/dL Final    BUN 03/31/2022 11  7 - 20 mg/dL Final    Creatinine 03/31/2022 0.69  0.50 - 1.00 mg/dL Final    eGFR if non African American 03/31/2022 126  > OR = 60 mL/min/1.73m2 Final    eGFR if  03/31/2022  146  > OR = 60 mL/min/1.73m2 Final    BUN/Creatinine Ratio 03/31/2022 NOT APPLICABLE  6 - 22 (calc) Final    Sodium 03/31/2022 139  135 - 146 mmol/L Final    Potassium 03/31/2022 4.3  3.8 - 5.1 mmol/L Final    Chloride 03/31/2022 105  98 - 110 mmol/L Final    CO2 03/31/2022 26  20 - 32 mmol/L Final    Calcium 03/31/2022 8.9  8.9 - 10.4 mg/dL Final    Total Protein 03/31/2022 6.6  6.3 - 8.2 g/dL Final    Albumin 03/31/2022 4.1  3.6 - 5.1 g/dL Final    Globulin, Total 03/31/2022 2.5  2.0 - 3.8 g/dL (calc) Final    Albumin/Globulin Ratio 03/31/2022 1.6  1.0 - 2.5 (calc) Final    Total Bilirubin 03/31/2022 0.7  0.2 - 1.1 mg/dL Final    Alkaline Phosphatase 03/31/2022 86  36 - 128 U/L Final    AST 03/31/2022 14  12 - 32 U/L Final    ALT 03/31/2022 13  5 - 32 U/L Final    Cholesterol 03/31/2022 197 (H)  <170 mg/dL Final    HDL 03/31/2022 57  >45 mg/dL Final    Triglycerides 03/31/2022 186 (H)  <90 mg/dL Final    LDL Cholesterol 03/31/2022 110 (H)  <110 mg/dL (calc) Final    HDL/Cholesterol Ratio 03/31/2022 3.5  <5.0 (calc) Final    Non HDL Chol. (LDL+VLDL) 03/31/2022 140 (H)  <120 mg/dL (calc) Final    TSH w/reflex to FT4 03/31/2022 2.17  mIU/L Final    HIV Ag/Ab 4th Gen 03/31/2022 NON-REACTIVE  NON-REACTIVE Final    Hepatitis C Ab 03/31/2022 NON-REACTIVE  NON-REACTIVE Final    Signal/Cutoff 03/31/2022 0.00  <1.00 Final       Past Medical History:   Diagnosis Date    ADHD (attention deficit hyperactivity disorder)     Anxiety     Depression     Primary insomnia      Past Surgical History:   Procedure Laterality Date    APPENDECTOMY       Family History   Problem Relation Age of Onset    Cancer Mother     Alcohol abuse Father     Depression Father     No Known Problems Sister     Cancer Paternal Grandfather         prostate       Marital Status: Single  Alcohol History:  reports that she does not currently use alcohol.  Tobacco History:  reports that she has never smoked. She has never used smokeless tobacco.  Drug  "History:  reports no history of drug use.    Review of patient's allergies indicates:  No Known Allergies    Current Outpatient Medications:     [START ON 10/15/2022] dextroamphetamine-amphetamine 30 mg Tab, Take 1 tablet (30 mg total) by mouth 2 (two) times a day., Disp: 60 tablet, Rfl: 0    EScitalopram oxalate (LEXAPRO) 10 MG tablet, Take 1 tablet (10 mg total) by mouth once daily., Disp: 30 tablet, Rfl: 5    ETONOGESTREL-ETHINYL ESTRADIOL VAGL, Place vaginally., Disp: , Rfl:     suvorexant (BELSOMRA) 10 mg Tab, Take 10 mg by mouth every evening., Disp: 30 tablet, Rfl: 5    [START ON 9/15/2022] dextroamphetamine-amphetamine 30 mg Tab, Take 1 tablet (30 mg total) by mouth 2 (two) times a day., Disp: 60 tablet, Rfl: 0    hydrOXYzine (ATARAX) 50 MG tablet, Take 50 mg by mouth every evening., Disp: , Rfl:     lisinopriL (PRINIVIL,ZESTRIL) 5 MG tablet, Take 1 tablet (5 mg total) by mouth once daily., Disp: 30 tablet, Rfl: 2    Review of Systems   Constitutional:  Negative for appetite change, chills, fatigue, fever and unexpected weight change.   HENT:  Negative for congestion.    Respiratory:  Negative for cough, chest tightness and shortness of breath.    Cardiovascular:  Negative for chest pain and palpitations.   Gastrointestinal:  Negative for abdominal distention and abdominal pain.   Endocrine: Negative for cold intolerance and heat intolerance.   Genitourinary:  Negative for difficulty urinating and dysuria.   Musculoskeletal:  Negative for arthralgias and back pain.   Neurological:  Negative for dizziness, weakness and headaches.        Objective:      Vitals:    09/13/22 1343   BP: (!) 142/82   Pulse: 110   SpO2: 99%   Weight: 71.2 kg (157 lb)   Height: 4' 11" (1.499 m)     Physical Exam  Constitutional:       General: She is not in acute distress.     Appearance: She is well-developed.   HENT:      Head: Normocephalic and atraumatic.   Eyes:      Conjunctiva/sclera: Conjunctivae normal.      Pupils: Pupils " are equal, round, and reactive to light.   Neck:      Thyroid: No thyromegaly.   Cardiovascular:      Rate and Rhythm: Normal rate and regular rhythm.      Heart sounds: Normal heart sounds.   Pulmonary:      Effort: Pulmonary effort is normal.      Breath sounds: Normal breath sounds.   Abdominal:      General: Bowel sounds are normal. There is no distension.      Palpations: Abdomen is soft.      Tenderness: There is no abdominal tenderness.   Musculoskeletal:         General: Normal range of motion.      Cervical back: Normal range of motion and neck supple.   Skin:     General: Skin is warm and dry.      Findings: No erythema.   Neurological:      Mental Status: She is alert and oriented to person, place, and time.      Cranial Nerves: No cranial nerve deficit.         Assessment:       1. Essential hypertension         Plan:       Essential hypertension  Comments:  will start lisinopril 5mg. keep log closely at home. and will update me in one week. f/u in person in 3-4 weeks.  Orders:  -     lisinopriL (PRINIVIL,ZESTRIL) 5 MG tablet; Take 1 tablet (5 mg total) by mouth once daily.  Dispense: 30 tablet; Refill: 2    Follow up in about 4 weeks (around 10/11/2022) for BP Check-Up.        9/13/2022 Simone Crews PA-C

## 2022-09-26 ENCOUNTER — PATIENT MESSAGE (OUTPATIENT)
Dept: FAMILY MEDICINE | Facility: CLINIC | Age: 22
End: 2022-09-26

## 2022-10-05 ENCOUNTER — PATIENT MESSAGE (OUTPATIENT)
Dept: FAMILY MEDICINE | Facility: CLINIC | Age: 22
End: 2022-10-05

## 2022-10-05 DIAGNOSIS — I10 ESSENTIAL HYPERTENSION: ICD-10-CM

## 2022-10-05 RX ORDER — LISINOPRIL 5 MG/1
5 TABLET ORAL 2 TIMES DAILY
Qty: 60 TABLET | Refills: 2 | Status: SHIPPED | OUTPATIENT
Start: 2022-10-05 | End: 2022-10-13

## 2022-10-13 ENCOUNTER — OFFICE VISIT (OUTPATIENT)
Dept: FAMILY MEDICINE | Facility: CLINIC | Age: 22
End: 2022-10-13
Payer: COMMERCIAL

## 2022-10-13 VITALS
DIASTOLIC BLOOD PRESSURE: 89 MMHG | OXYGEN SATURATION: 98 % | HEART RATE: 108 BPM | BODY MASS INDEX: 31.45 KG/M2 | WEIGHT: 156 LBS | SYSTOLIC BLOOD PRESSURE: 130 MMHG | HEIGHT: 59 IN

## 2022-10-13 DIAGNOSIS — Z51.81 ENCOUNTER FOR THERAPEUTIC DRUG MONITORING: ICD-10-CM

## 2022-10-13 DIAGNOSIS — Z79.899 ENCOUNTER FOR LONG-TERM (CURRENT) USE OF OTHER MEDICATIONS: ICD-10-CM

## 2022-10-13 DIAGNOSIS — I10 ESSENTIAL HYPERTENSION: Primary | ICD-10-CM

## 2022-10-13 DIAGNOSIS — R00.2 PALPITATIONS: ICD-10-CM

## 2022-10-13 DIAGNOSIS — H81.10 BENIGN PAROXYSMAL POSITIONAL VERTIGO, UNSPECIFIED LATERALITY: ICD-10-CM

## 2022-10-13 LAB
EKG 12-LEAD: NORMAL
PR INTERVAL: NORMAL
PRT AXES: NORMAL
QRS DURATION: NORMAL
QT/QTC: NORMAL
VENTRICULAR RATE: NORMAL

## 2022-10-13 PROCEDURE — 4010F PR ACE/ARB THEARPY RXD/TAKEN: ICD-10-PCS | Mod: CPTII,S$GLB,, | Performed by: PHYSICIAN ASSISTANT

## 2022-10-13 PROCEDURE — 3075F SYST BP GE 130 - 139MM HG: CPT | Mod: CPTII,S$GLB,, | Performed by: PHYSICIAN ASSISTANT

## 2022-10-13 PROCEDURE — 99214 OFFICE O/P EST MOD 30 MIN: CPT | Mod: 25,S$GLB,, | Performed by: PHYSICIAN ASSISTANT

## 2022-10-13 PROCEDURE — 3075F PR MOST RECENT SYSTOLIC BLOOD PRESS GE 130-139MM HG: ICD-10-PCS | Mod: CPTII,S$GLB,, | Performed by: PHYSICIAN ASSISTANT

## 2022-10-13 PROCEDURE — 1159F MED LIST DOCD IN RCRD: CPT | Mod: CPTII,S$GLB,, | Performed by: PHYSICIAN ASSISTANT

## 2022-10-13 PROCEDURE — 3079F DIAST BP 80-89 MM HG: CPT | Mod: CPTII,S$GLB,, | Performed by: PHYSICIAN ASSISTANT

## 2022-10-13 PROCEDURE — 93000 POCT EKG 12-LEAD: ICD-10-PCS | Mod: S$GLB,,, | Performed by: PHYSICIAN ASSISTANT

## 2022-10-13 PROCEDURE — 4010F ACE/ARB THERAPY RXD/TAKEN: CPT | Mod: CPTII,S$GLB,, | Performed by: PHYSICIAN ASSISTANT

## 2022-10-13 PROCEDURE — 99214 PR OFFICE/OUTPT VISIT, EST, LEVL IV, 30-39 MIN: ICD-10-PCS | Mod: 25,S$GLB,, | Performed by: PHYSICIAN ASSISTANT

## 2022-10-13 PROCEDURE — 93000 ELECTROCARDIOGRAM COMPLETE: CPT | Mod: S$GLB,,, | Performed by: PHYSICIAN ASSISTANT

## 2022-10-13 PROCEDURE — 1159F PR MEDICATION LIST DOCUMENTED IN MEDICAL RECORD: ICD-10-PCS | Mod: CPTII,S$GLB,, | Performed by: PHYSICIAN ASSISTANT

## 2022-10-13 PROCEDURE — 3079F PR MOST RECENT DIASTOLIC BLOOD PRESSURE 80-89 MM HG: ICD-10-PCS | Mod: CPTII,S$GLB,, | Performed by: PHYSICIAN ASSISTANT

## 2022-10-13 RX ORDER — LISINOPRIL 10 MG/1
10 TABLET ORAL 2 TIMES DAILY
Qty: 60 TABLET | Refills: 2 | Status: CANCELLED | OUTPATIENT
Start: 2022-10-13 | End: 2023-01-11

## 2022-10-13 RX ORDER — METOPROLOL SUCCINATE 25 MG/1
25 TABLET, EXTENDED RELEASE ORAL DAILY
Qty: 30 TABLET | Refills: 2 | Status: SHIPPED | OUTPATIENT
Start: 2022-10-13 | End: 2022-11-08 | Stop reason: SDUPTHER

## 2022-10-13 RX ORDER — MECLIZINE HYDROCHLORIDE 25 MG/1
25 TABLET ORAL 3 TIMES DAILY PRN
Qty: 30 TABLET | Refills: 1 | Status: CANCELLED | OUTPATIENT
Start: 2022-10-13 | End: 2022-12-12

## 2022-10-13 NOTE — PROGRESS NOTES
SUBJECTIVE:    Patient ID: Nasima Lemus is a 21 y.o. female.    Chief Complaint: Follow-up (4 week f/u//brought med bottles//declined flu vac//last dose adderall this am//urine drug screen ordered//tc)    This is a 21-year-old female who presents today for 4 week blood pressure follow-up.  Now taking 10 mg of lisinopril.  Readings from home look much improved.  Readings here in the office are still elevated secondary to I suspect white coat hypertension.  Heart rate is also up which supports anxiety in the office. She brings in her machine for calibration and accuracy. It is within a few points of my reading also. BP averaging at home 120s/80s.     She does report some vertigo sxs of dizziness and room spinning just in the past week. No URI or sickness. Thinks that the dizziness is worse when she changes position. She is hydrating well at home and at work.  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  Noticed a sinus arrhythmia, heart rate in the lower 100s.      No visits with results within 6 Month(s) from this visit.   Latest known visit with results is:   Office Visit on 03/29/2022   Component Date Value Ref Range Status    WBC 03/31/2022 7.7  3.8 - 10.8 Thousand/uL Final    RBC 03/31/2022 4.95  3.80 - 5.10 Million/uL Final    Hemoglobin 03/31/2022 13.4  11.7 - 15.5 g/dL Final    Hematocrit 03/31/2022 41.7  35.0 - 45.0 % Final    MCV 03/31/2022 84.2  80.0 - 100.0 fL Final    MCH 03/31/2022 27.1  27.0 - 33.0 pg Final    MCHC 03/31/2022 32.1  32.0 - 36.0 g/dL Final    RDW 03/31/2022 13.4  11.0 - 15.0 % Final    Platelets 03/31/2022 349  140 - 400 Thousand/uL Final    MPV 03/31/2022 9.4  7.5 - 12.5 fL Final    Neutrophils, Abs 03/31/2022 4,505  1,500 - 7,800 cells/uL Final    Lymph # 03/31/2022 2,379  850 - 3,900 cells/uL Final    Mono # 03/31/2022 647  200 - 950 cells/uL Final    Eos # 03/31/2022 146  15 - 500 cells/uL Final    Baso # 03/31/2022 23  0 -  200 cells/uL Final    Neutrophils Relative 03/31/2022 58.5  % Final    Lymph % 03/31/2022 30.9  % Final    Mono % 03/31/2022 8.4  % Final    Eosinophil % 03/31/2022 1.9  % Final    Basophil % 03/31/2022 0.3  % Final    Glucose 03/31/2022 84  65 - 99 mg/dL Final    BUN 03/31/2022 11  7 - 20 mg/dL Final    Creatinine 03/31/2022 0.69  0.50 - 1.00 mg/dL Final    eGFR if non African American 03/31/2022 126  > OR = 60 mL/min/1.73m2 Final    eGFR if African American 03/31/2022 146  > OR = 60 mL/min/1.73m2 Final    BUN/Creatinine Ratio 03/31/2022 NOT APPLICABLE  6 - 22 (calc) Final    Sodium 03/31/2022 139  135 - 146 mmol/L Final    Potassium 03/31/2022 4.3  3.8 - 5.1 mmol/L Final    Chloride 03/31/2022 105  98 - 110 mmol/L Final    CO2 03/31/2022 26  20 - 32 mmol/L Final    Calcium 03/31/2022 8.9  8.9 - 10.4 mg/dL Final    Total Protein 03/31/2022 6.6  6.3 - 8.2 g/dL Final    Albumin 03/31/2022 4.1  3.6 - 5.1 g/dL Final    Globulin, Total 03/31/2022 2.5  2.0 - 3.8 g/dL (calc) Final    Albumin/Globulin Ratio 03/31/2022 1.6  1.0 - 2.5 (calc) Final    Total Bilirubin 03/31/2022 0.7  0.2 - 1.1 mg/dL Final    Alkaline Phosphatase 03/31/2022 86  36 - 128 U/L Final    AST 03/31/2022 14  12 - 32 U/L Final    ALT 03/31/2022 13  5 - 32 U/L Final    Cholesterol 03/31/2022 197 (H)  <170 mg/dL Final    HDL 03/31/2022 57  >45 mg/dL Final    Triglycerides 03/31/2022 186 (H)  <90 mg/dL Final    LDL Cholesterol 03/31/2022 110 (H)  <110 mg/dL (calc) Final    HDL/Cholesterol Ratio 03/31/2022 3.5  <5.0 (calc) Final    Non HDL Chol. (LDL+VLDL) 03/31/2022 140 (H)  <120 mg/dL (calc) Final    TSH w/reflex to FT4 03/31/2022 2.17  mIU/L Final    HIV Ag/Ab 4th Gen 03/31/2022 NON-REACTIVE  NON-REACTIVE Final    Hepatitis C Ab 03/31/2022 NON-REACTIVE  NON-REACTIVE Final    Signal/Cutoff 03/31/2022 0.00  <1.00 Final       Past Medical History:   Diagnosis Date    ADHD (attention deficit hyperactivity disorder)     Anxiety     Depression     Primary  insomnia      Past Surgical History:   Procedure Laterality Date    APPENDECTOMY       Family History   Problem Relation Age of Onset    Cancer Mother     Alcohol abuse Father     Depression Father     No Known Problems Sister     Cancer Paternal Grandfather         prostate       Marital Status: Single  Alcohol History:  reports that she does not currently use alcohol.  Tobacco History:  reports that she has never smoked. She has never used smokeless tobacco.  Drug History:  reports no history of drug use.    Review of patient's allergies indicates:  No Known Allergies    Current Outpatient Medications:     dextroamphetamine-amphetamine 30 mg Tab, Take 1 tablet (30 mg total) by mouth 2 (two) times a day., Disp: 60 tablet, Rfl: 0    EScitalopram oxalate (LEXAPRO) 10 MG tablet, Take 1 tablet (10 mg total) by mouth once daily., Disp: 30 tablet, Rfl: 5    ETONOGESTREL-ETHINYL ESTRADIOL VAGL, Place vaginally., Disp: , Rfl:     suvorexant (BELSOMRA) 10 mg Tab, Take 10 mg by mouth every evening., Disp: 30 tablet, Rfl: 5    [START ON 10/15/2022] dextroamphetamine-amphetamine 30 mg Tab, Take 1 tablet (30 mg total) by mouth 2 (two) times a day., Disp: 60 tablet, Rfl: 0    hydrOXYzine (ATARAX) 50 MG tablet, Take 50 mg by mouth every evening., Disp: , Rfl:     metoprolol succinate (TOPROL-XL) 25 MG 24 hr tablet, Take 1 tablet (25 mg total) by mouth once daily., Disp: 30 tablet, Rfl: 2    Review of Systems   Constitutional:  Negative for appetite change, chills, fatigue, fever and unexpected weight change.   HENT:  Negative for congestion.    Respiratory:  Negative for cough, chest tightness and shortness of breath.    Cardiovascular:  Negative for chest pain and palpitations.   Gastrointestinal:  Negative for abdominal distention and abdominal pain.   Endocrine: Negative for cold intolerance and heat intolerance.   Genitourinary:  Negative for difficulty urinating and dysuria.   Musculoskeletal:  Negative for arthralgias and  "back pain.   Neurological:  Positive for dizziness and light-headedness. Negative for weakness and headaches.        Objective:      Vitals:    10/13/22 1352 10/13/22 1358 10/13/22 1414   BP: (!) 140/96 (!) 141/99 130/89   Pulse: (!) 112  108   SpO2: 98%     Weight: 70.8 kg (156 lb)     Height: 4' 11" (1.499 m)       Physical Exam  Constitutional:       General: She is not in acute distress.     Appearance: She is well-developed.   HENT:      Head: Normocephalic and atraumatic.   Eyes:      Conjunctiva/sclera: Conjunctivae normal.      Pupils: Pupils are equal, round, and reactive to light.   Neck:      Thyroid: No thyromegaly.   Cardiovascular:      Rate and Rhythm: Regular rhythm. Tachycardia present.      Heart sounds: Normal heart sounds. No murmur heard.  Pulmonary:      Effort: Pulmonary effort is normal.      Breath sounds: Normal breath sounds.   Abdominal:      General: Bowel sounds are normal. There is no distension.      Palpations: Abdomen is soft.      Tenderness: There is no abdominal tenderness.   Musculoskeletal:         General: Normal range of motion.      Cervical back: Normal range of motion and neck supple.   Skin:     General: Skin is warm and dry.      Findings: No erythema.   Neurological:      Mental Status: She is alert and oriented to person, place, and time.      Cranial Nerves: No cranial nerve deficit.         Assessment:       1. Essential hypertension    2. Encounter for therapeutic drug monitoring    3. Encounter for long-term (current) use of other medications    4. Benign paroxysmal positional vertigo, unspecified laterality    5. Palpitations           Plan:       Essential hypertension  Comments:  Pressure looking much better. Home readings 120s/80s. confirmed cuff here.  May switch to beta-blocker given heart rate and sinus arrhythmia present.  Orders:  -     POCT EKG 12-LEAD (NOT FOR [facility] USE)    Encounter for therapeutic drug monitoring  -     DRUG MONITOR, PANEL 4, W/CONF, " URINE    Encounter for long-term (current) use of other medications  -     DRUG MONITOR, PANEL 4, W/CONF, URINE    Benign paroxysmal positional vertigo, unspecified laterality  Comments:  suspect vertigo vs labyrinthitis from bad ear infection a few months back. mostly with positional changes. will stay hydrated meclizine otc can be tried    Palpitations  Comments:  Checking EKG today: NSR with sinus arrhythmia. rate 94 bpm.  Start beta-blocker, return log in 2 weeks regarding pressure and heart rate and symptoms.  Orders:  -     POCT EKG 12-LEAD (NOT FOR [facility] USE)  -     metoprolol succinate (TOPROL-XL) 25 MG 24 hr tablet; Take 1 tablet (25 mg total) by mouth once daily.  Dispense: 30 tablet; Refill: 2    No follow-ups on file.        10/13/2022 Simone Crews PA-C

## 2022-10-15 LAB
AMPHET UR-MCNC: 9902 NG/ML
AMPHETAMINES UR QL: POSITIVE NG/ML
BARBITURATES UR QL: NEGATIVE NG/ML
BENZODIAZ UR QL: NEGATIVE NG/ML
BZE UR QL: NEGATIVE NG/ML
CREAT UR-MCNC: 88.5 MG/DL
DRUG SCREEN COMMENT UR-IMP: ABNORMAL
METHADONE UR QL: NEGATIVE NG/ML
METHAMPHET UR-MCNC: NEGATIVE NG/ML
NOTES AND COMMENTS: ABNORMAL
OPIATES UR QL: NEGATIVE NG/ML
OXIDANTS UR QL: NEGATIVE MCG/ML
OXYCODONE UR QL: NEGATIVE NG/ML
PCP UR QL: NEGATIVE NG/ML
PH UR: 4.9 [PH] (ref 4.5–9)

## 2022-11-08 ENCOUNTER — OFFICE VISIT (OUTPATIENT)
Dept: FAMILY MEDICINE | Facility: CLINIC | Age: 22
End: 2022-11-08
Payer: COMMERCIAL

## 2022-11-08 VITALS
DIASTOLIC BLOOD PRESSURE: 84 MMHG | HEIGHT: 59 IN | HEART RATE: 76 BPM | BODY MASS INDEX: 31.85 KG/M2 | WEIGHT: 158 LBS | SYSTOLIC BLOOD PRESSURE: 124 MMHG

## 2022-11-08 DIAGNOSIS — R00.2 PALPITATIONS: ICD-10-CM

## 2022-11-08 DIAGNOSIS — F90.0 ATTENTION DEFICIT HYPERACTIVITY DISORDER (ADHD), PREDOMINANTLY INATTENTIVE TYPE: Primary | ICD-10-CM

## 2022-11-08 DIAGNOSIS — I10 ESSENTIAL HYPERTENSION: ICD-10-CM

## 2022-11-08 PROCEDURE — 4010F ACE/ARB THERAPY RXD/TAKEN: CPT | Mod: CPTII,S$GLB,, | Performed by: PHYSICIAN ASSISTANT

## 2022-11-08 PROCEDURE — 4010F PR ACE/ARB THEARPY RXD/TAKEN: ICD-10-PCS | Mod: CPTII,S$GLB,, | Performed by: PHYSICIAN ASSISTANT

## 2022-11-08 PROCEDURE — 99214 PR OFFICE/OUTPT VISIT, EST, LEVL IV, 30-39 MIN: ICD-10-PCS | Mod: S$GLB,,, | Performed by: PHYSICIAN ASSISTANT

## 2022-11-08 PROCEDURE — 3079F PR MOST RECENT DIASTOLIC BLOOD PRESSURE 80-89 MM HG: ICD-10-PCS | Mod: CPTII,S$GLB,, | Performed by: PHYSICIAN ASSISTANT

## 2022-11-08 PROCEDURE — 3008F PR BODY MASS INDEX (BMI) DOCUMENTED: ICD-10-PCS | Mod: CPTII,S$GLB,, | Performed by: PHYSICIAN ASSISTANT

## 2022-11-08 PROCEDURE — 3079F DIAST BP 80-89 MM HG: CPT | Mod: CPTII,S$GLB,, | Performed by: PHYSICIAN ASSISTANT

## 2022-11-08 PROCEDURE — 3074F SYST BP LT 130 MM HG: CPT | Mod: CPTII,S$GLB,, | Performed by: PHYSICIAN ASSISTANT

## 2022-11-08 PROCEDURE — 3074F PR MOST RECENT SYSTOLIC BLOOD PRESSURE < 130 MM HG: ICD-10-PCS | Mod: CPTII,S$GLB,, | Performed by: PHYSICIAN ASSISTANT

## 2022-11-08 PROCEDURE — 1159F PR MEDICATION LIST DOCUMENTED IN MEDICAL RECORD: ICD-10-PCS | Mod: CPTII,S$GLB,, | Performed by: PHYSICIAN ASSISTANT

## 2022-11-08 PROCEDURE — 1159F MED LIST DOCD IN RCRD: CPT | Mod: CPTII,S$GLB,, | Performed by: PHYSICIAN ASSISTANT

## 2022-11-08 PROCEDURE — 3008F BODY MASS INDEX DOCD: CPT | Mod: CPTII,S$GLB,, | Performed by: PHYSICIAN ASSISTANT

## 2022-11-08 PROCEDURE — 99214 OFFICE O/P EST MOD 30 MIN: CPT | Mod: S$GLB,,, | Performed by: PHYSICIAN ASSISTANT

## 2022-11-08 RX ORDER — DEXTROAMPHETAMINE SACCHARATE, AMPHETAMINE ASPARTATE, DEXTROAMPHETAMINE SULFATE AND AMPHETAMINE SULFATE 7.5; 7.5; 7.5; 7.5 MG/1; MG/1; MG/1; MG/1
30 TABLET ORAL 2 TIMES DAILY
Qty: 60 TABLET | Refills: 0 | Status: SHIPPED | OUTPATIENT
Start: 2022-12-14 | End: 2023-01-13

## 2022-11-08 RX ORDER — METOPROLOL SUCCINATE 25 MG/1
25 TABLET, EXTENDED RELEASE ORAL DAILY
Qty: 30 TABLET | Refills: 2 | Status: SHIPPED | OUTPATIENT
Start: 2022-11-08 | End: 2023-07-04 | Stop reason: SDUPTHER

## 2022-11-08 RX ORDER — DEXTROAMPHETAMINE SACCHARATE, AMPHETAMINE ASPARTATE, DEXTROAMPHETAMINE SULFATE AND AMPHETAMINE SULFATE 7.5; 7.5; 7.5; 7.5 MG/1; MG/1; MG/1; MG/1
30 TABLET ORAL 2 TIMES DAILY
Qty: 60 TABLET | Refills: 0 | Status: SHIPPED | OUTPATIENT
Start: 2022-11-14 | End: 2022-12-14

## 2022-11-08 RX ORDER — DEXTROAMPHETAMINE SACCHARATE, AMPHETAMINE ASPARTATE, DEXTROAMPHETAMINE SULFATE AND AMPHETAMINE SULFATE 7.5; 7.5; 7.5; 7.5 MG/1; MG/1; MG/1; MG/1
30 TABLET ORAL 2 TIMES DAILY
Qty: 60 TABLET | Refills: 0 | Status: SHIPPED | OUTPATIENT
Start: 2023-01-13 | End: 2023-02-27 | Stop reason: SDUPTHER

## 2022-11-08 RX ORDER — ETONOGESTREL AND ETHINYL ESTRADIOL .12; .015 MG/D; MG/D
INSERT, EXTENDED RELEASE VAGINAL
COMMUNITY
Start: 2022-08-15 | End: 2023-07-20

## 2022-11-08 NOTE — PROGRESS NOTES
SUBJECTIVE:    Patient ID: Nasima Lemus is a 21 y.o. female.    Chief Complaint: ADHD (Brought bottles, Flu declined// SW)    This is a 22 yo female who presents today for regarding blood pressure and ADHD.  Given elevated heart rate and sinus arrhythmia seen on EKG we switched her blood pressure medicine to metoprolol.  She brings a log today in clinic and reports doing well with current dose at home.  On last visit she was having some persistent dizziness and lightheadedness secondary to really bad ear infection that did cause labyrinthitis.  Today she reports huge improvement in the dizziness. No further sxs with the ear. No problems with the adderall at the current dose. Some days my find that it wears off sooner than she would like but not enough to warrant a change in the dosage.      Office Visit on 05/10/2022   Component Date Value Ref Range Status    Amphetamines 10/13/2022 POSITIVE (A)  <500 ng/mL Final    Amphetamine 10/13/2022 9,902 (H)  <250 ng/mL Final    Methamphetamine 10/13/2022 NEGATIVE  <250 ng/mL Final    Amphetamines Comments 10/13/2022    Final    Barbiturates 10/13/2022 NEGATIVE  <300 ng/mL Final    Benzodiazepines 10/13/2022 NEGATIVE  <100 ng/mL Final    Cocaine Metabolites 10/13/2022 NEGATIVE  <150 ng/mL Final    Methadone 10/13/2022 NEGATIVE  <100 ng/mL Final    Opiates 10/13/2022 NEGATIVE  <100 ng/mL Final    Oxycodone 10/13/2022 NEGATIVE  <100 ng/mL Final    Phencyclidine 10/13/2022 NEGATIVE  <25 ng/mL Final    Creatinine 10/13/2022 88.5  > or = 20.0 mg/dL Final    pH 10/13/2022 4.9  4.5 - 9.0 Final    Oxidants, Urine (Tox) 10/13/2022 NEGATIVE  <200 mcg/mL Final    Notes and Comments 10/13/2022    Final       Past Medical History:   Diagnosis Date    ADHD (attention deficit hyperactivity disorder)     Anxiety     Depression     Primary insomnia      Past Surgical History:   Procedure Laterality Date    APPENDECTOMY       Family History   Problem Relation Age of Onset    Cancer Mother      Alcohol abuse Father     Depression Father     No Known Problems Sister     Cancer Paternal Grandfather         prostate       Marital Status: Single  Alcohol History:  reports that she does not currently use alcohol.  Tobacco History:  reports that she has never smoked. She has never used smokeless tobacco.  Drug History:  reports no history of drug use.    Review of patient's allergies indicates:  No Known Allergies    Current Outpatient Medications:     EScitalopram oxalate (LEXAPRO) 10 MG tablet, Take 1 tablet (10 mg total) by mouth once daily., Disp: 30 tablet, Rfl: 5    NUVARING 0.12-0.015 mg/24 hr vaginal ring, , Disp: , Rfl:     suvorexant (BELSOMRA) 10 mg Tab, Take 10 mg by mouth every evening., Disp: 30 tablet, Rfl: 5    [START ON 11/14/2022] dextroamphetamine-amphetamine 30 mg Tab, Take 1 tablet (30 mg total) by mouth 2 (two) times a day., Disp: 60 tablet, Rfl: 0    [START ON 12/14/2022] dextroamphetamine-amphetamine 30 mg Tab, Take 1 tablet (30 mg total) by mouth 2 (two) times a day., Disp: 60 tablet, Rfl: 0    [START ON 1/13/2023] dextroamphetamine-amphetamine 30 mg Tab, Take 1 tablet (30 mg total) by mouth 2 (two) times a day., Disp: 60 tablet, Rfl: 0    metoprolol succinate (TOPROL-XL) 25 MG 24 hr tablet, Take 1 tablet (25 mg total) by mouth once daily., Disp: 30 tablet, Rfl: 2    Review of Systems   Constitutional:  Negative for appetite change, chills, fatigue, fever and unexpected weight change.   HENT:  Negative for congestion.    Respiratory:  Negative for cough, chest tightness and shortness of breath.    Cardiovascular:  Negative for chest pain and palpitations.   Gastrointestinal:  Negative for abdominal distention and abdominal pain.   Endocrine: Negative for cold intolerance and heat intolerance.   Genitourinary:  Negative for difficulty urinating and dysuria.   Musculoskeletal:  Negative for arthralgias and back pain.   Neurological:  Negative for dizziness, weakness and headaches.     "    Objective:      Vitals:    11/08/22 0711   BP: 124/84   Pulse: 76   Weight: 71.7 kg (158 lb)   Height: 4' 11" (1.499 m)     Physical Exam  Constitutional:       General: She is not in acute distress.     Appearance: She is well-developed.   HENT:      Head: Normocephalic and atraumatic.   Eyes:      Conjunctiva/sclera: Conjunctivae normal.      Pupils: Pupils are equal, round, and reactive to light.   Neck:      Thyroid: No thyromegaly.   Cardiovascular:      Rate and Rhythm: Normal rate and regular rhythm.      Heart sounds: Normal heart sounds.   Pulmonary:      Effort: Pulmonary effort is normal.      Breath sounds: Normal breath sounds.   Abdominal:      General: Bowel sounds are normal. There is no distension.      Palpations: Abdomen is soft.      Tenderness: There is no abdominal tenderness.   Musculoskeletal:         General: Normal range of motion.      Cervical back: Normal range of motion and neck supple.   Skin:     General: Skin is warm and dry.      Findings: No erythema.   Neurological:      Mental Status: She is alert and oriented to person, place, and time.      Cranial Nerves: No cranial nerve deficit.         Assessment:       1. Attention deficit hyperactivity disorder (ADHD), predominantly inattentive type    2. Palpitations    3. Essential hypertension           Plan:       Attention deficit hyperactivity disorder (ADHD), predominantly inattentive type  Comments:  refills of medication now. Doing well. Taking over from another prvider.  Orders:  -     dextroamphetamine-amphetamine 30 mg Tab; Take 1 tablet (30 mg total) by mouth 2 (two) times a day.  Dispense: 60 tablet; Refill: 0  -     dextroamphetamine-amphetamine 30 mg Tab; Take 1 tablet (30 mg total) by mouth 2 (two) times a day.  Dispense: 60 tablet; Refill: 0  -     dextroamphetamine-amphetamine 30 mg Tab; Take 1 tablet (30 mg total) by mouth 2 (two) times a day.  Dispense: 60 tablet; Refill: 0    Palpitations  Comments:  MUCH better " controlled. pressure at goal also. will continue with medication at present. element of dysautonomia  Orders:  -     metoprolol succinate (TOPROL-XL) 25 MG 24 hr tablet; Take 1 tablet (25 mg total) by mouth once daily.  Dispense: 30 tablet; Refill: 2    Essential hypertension  Comments:  Very well managed. keep metoprolol succinate as is.    Follow up in about 6 months (around 5/8/2023).        11/8/2022 Simone Crews PA-C

## 2022-11-10 ENCOUNTER — PATIENT MESSAGE (OUTPATIENT)
Dept: FAMILY MEDICINE | Facility: CLINIC | Age: 22
End: 2022-11-10

## 2022-11-10 DIAGNOSIS — G90.1 DYSAUTONOMIA: Primary | ICD-10-CM

## 2022-11-11 ENCOUNTER — PATIENT MESSAGE (OUTPATIENT)
Dept: FAMILY MEDICINE | Facility: CLINIC | Age: 22
End: 2022-11-11

## 2023-02-27 DIAGNOSIS — F90.0 ATTENTION DEFICIT HYPERACTIVITY DISORDER (ADHD), PREDOMINANTLY INATTENTIVE TYPE: ICD-10-CM

## 2023-02-27 RX ORDER — DEXTROAMPHETAMINE SACCHARATE, AMPHETAMINE ASPARTATE, DEXTROAMPHETAMINE SULFATE AND AMPHETAMINE SULFATE 7.5; 7.5; 7.5; 7.5 MG/1; MG/1; MG/1; MG/1
30 TABLET ORAL 2 TIMES DAILY
Qty: 60 TABLET | Refills: 0 | Status: SHIPPED | OUTPATIENT
Start: 2023-02-27 | End: 2023-04-20 | Stop reason: SDUPTHER

## 2023-04-20 DIAGNOSIS — F90.0 ATTENTION DEFICIT HYPERACTIVITY DISORDER (ADHD), PREDOMINANTLY INATTENTIVE TYPE: ICD-10-CM

## 2023-04-20 RX ORDER — DEXTROAMPHETAMINE SACCHARATE, AMPHETAMINE ASPARTATE, DEXTROAMPHETAMINE SULFATE AND AMPHETAMINE SULFATE 7.5; 7.5; 7.5; 7.5 MG/1; MG/1; MG/1; MG/1
30 TABLET ORAL 2 TIMES DAILY
Qty: 60 TABLET | Refills: 0 | Status: SHIPPED | OUTPATIENT
Start: 2023-04-20 | End: 2023-07-04 | Stop reason: SDUPTHER

## 2023-04-20 NOTE — TELEPHONE ENCOUNTER
----- Message from Marsha Solorio LPN sent at 4/20/2023  2:01 PM CDT -----  Pt requesting refills of Adderall.

## 2023-05-09 ENCOUNTER — TELEPHONE (OUTPATIENT)
Dept: FAMILY MEDICINE | Facility: CLINIC | Age: 23
End: 2023-05-09

## 2023-05-19 ENCOUNTER — PATIENT MESSAGE (OUTPATIENT)
Dept: FAMILY MEDICINE | Facility: CLINIC | Age: 23
End: 2023-05-19

## 2023-05-19 DIAGNOSIS — T75.3XXA MOTION SICKNESS, INITIAL ENCOUNTER: Primary | ICD-10-CM

## 2023-05-23 RX ORDER — SCOLOPAMINE TRANSDERMAL SYSTEM 1 MG/1
1 PATCH, EXTENDED RELEASE TRANSDERMAL
Qty: 4 PATCH | Refills: 1 | Status: SHIPPED | OUTPATIENT
Start: 2023-05-23 | End: 2023-06-16

## 2023-06-01 ENCOUNTER — PATIENT MESSAGE (OUTPATIENT)
Dept: FAMILY MEDICINE | Facility: CLINIC | Age: 23
End: 2023-06-01

## 2023-07-04 DIAGNOSIS — R00.2 PALPITATIONS: ICD-10-CM

## 2023-07-04 DIAGNOSIS — F41.9 ANXIETY: ICD-10-CM

## 2023-07-04 DIAGNOSIS — F90.0 ATTENTION DEFICIT HYPERACTIVITY DISORDER (ADHD), PREDOMINANTLY INATTENTIVE TYPE: ICD-10-CM

## 2023-07-05 RX ORDER — ESCITALOPRAM OXALATE 10 MG/1
10 TABLET ORAL DAILY
Qty: 30 TABLET | Refills: 5 | Status: SHIPPED | OUTPATIENT
Start: 2023-07-05 | End: 2023-07-20

## 2023-07-05 RX ORDER — METOPROLOL SUCCINATE 25 MG/1
25 TABLET, EXTENDED RELEASE ORAL DAILY
Qty: 30 TABLET | Refills: 2 | Status: SHIPPED | OUTPATIENT
Start: 2023-07-05 | End: 2023-08-22 | Stop reason: SDUPTHER

## 2023-07-05 RX ORDER — DEXTROAMPHETAMINE SACCHARATE, AMPHETAMINE ASPARTATE, DEXTROAMPHETAMINE SULFATE AND AMPHETAMINE SULFATE 7.5; 7.5; 7.5; 7.5 MG/1; MG/1; MG/1; MG/1
30 TABLET ORAL 2 TIMES DAILY
Qty: 60 TABLET | Refills: 0 | Status: SHIPPED | OUTPATIENT
Start: 2023-07-05 | End: 2023-07-20 | Stop reason: SDUPTHER

## 2023-07-05 NOTE — TELEPHONE ENCOUNTER
Pt is needing a refill on her Escitalopram, Metoprolol and Adderall. Last office visit 11/08/2022. Next office visit 07/20/2023. Chart shows last dispense 04/21/2023. UDS done on 10/13/2022.

## 2023-07-19 DIAGNOSIS — F90.0 ATTENTION DEFICIT HYPERACTIVITY DISORDER (ADHD), PREDOMINANTLY INATTENTIVE TYPE: ICD-10-CM

## 2023-07-19 RX ORDER — DEXTROAMPHETAMINE SACCHARATE, AMPHETAMINE ASPARTATE, DEXTROAMPHETAMINE SULFATE AND AMPHETAMINE SULFATE 7.5; 7.5; 7.5; 7.5 MG/1; MG/1; MG/1; MG/1
30 TABLET ORAL 2 TIMES DAILY
Qty: 60 TABLET | Refills: 0 | Status: CANCELLED | OUTPATIENT
Start: 2023-07-19 | End: 2023-08-18

## 2023-07-20 ENCOUNTER — TELEPHONE (OUTPATIENT)
Dept: FAMILY MEDICINE | Facility: CLINIC | Age: 23
End: 2023-07-20

## 2023-07-20 ENCOUNTER — OFFICE VISIT (OUTPATIENT)
Dept: FAMILY MEDICINE | Facility: CLINIC | Age: 23
End: 2023-07-20
Payer: COMMERCIAL

## 2023-07-20 VITALS
SYSTOLIC BLOOD PRESSURE: 136 MMHG | DIASTOLIC BLOOD PRESSURE: 86 MMHG | HEART RATE: 80 BPM | WEIGHT: 182 LBS | HEIGHT: 59 IN | BODY MASS INDEX: 36.69 KG/M2

## 2023-07-20 DIAGNOSIS — F41.9 ANXIETY: Primary | ICD-10-CM

## 2023-07-20 DIAGNOSIS — F90.0 ATTENTION DEFICIT HYPERACTIVITY DISORDER (ADHD), PREDOMINANTLY INATTENTIVE TYPE: ICD-10-CM

## 2023-07-20 PROCEDURE — 3008F BODY MASS INDEX DOCD: CPT | Mod: CPTII,S$GLB,, | Performed by: PHYSICIAN ASSISTANT

## 2023-07-20 PROCEDURE — 1159F PR MEDICATION LIST DOCUMENTED IN MEDICAL RECORD: ICD-10-PCS | Mod: CPTII,S$GLB,, | Performed by: PHYSICIAN ASSISTANT

## 2023-07-20 PROCEDURE — 99214 PR OFFICE/OUTPT VISIT, EST, LEVL IV, 30-39 MIN: ICD-10-PCS | Mod: S$GLB,,, | Performed by: PHYSICIAN ASSISTANT

## 2023-07-20 PROCEDURE — 3075F PR MOST RECENT SYSTOLIC BLOOD PRESS GE 130-139MM HG: ICD-10-PCS | Mod: CPTII,S$GLB,, | Performed by: PHYSICIAN ASSISTANT

## 2023-07-20 PROCEDURE — 99214 OFFICE O/P EST MOD 30 MIN: CPT | Mod: S$GLB,,, | Performed by: PHYSICIAN ASSISTANT

## 2023-07-20 PROCEDURE — 3079F DIAST BP 80-89 MM HG: CPT | Mod: CPTII,S$GLB,, | Performed by: PHYSICIAN ASSISTANT

## 2023-07-20 PROCEDURE — 1159F MED LIST DOCD IN RCRD: CPT | Mod: CPTII,S$GLB,, | Performed by: PHYSICIAN ASSISTANT

## 2023-07-20 PROCEDURE — 3075F SYST BP GE 130 - 139MM HG: CPT | Mod: CPTII,S$GLB,, | Performed by: PHYSICIAN ASSISTANT

## 2023-07-20 PROCEDURE — 3008F PR BODY MASS INDEX (BMI) DOCUMENTED: ICD-10-PCS | Mod: CPTII,S$GLB,, | Performed by: PHYSICIAN ASSISTANT

## 2023-07-20 PROCEDURE — 3079F PR MOST RECENT DIASTOLIC BLOOD PRESSURE 80-89 MM HG: ICD-10-PCS | Mod: CPTII,S$GLB,, | Performed by: PHYSICIAN ASSISTANT

## 2023-07-20 RX ORDER — CITALOPRAM 10 MG/1
10 TABLET ORAL DAILY
Qty: 30 TABLET | Refills: 5 | Status: SHIPPED | OUTPATIENT
Start: 2023-07-20 | End: 2024-01-16

## 2023-07-20 RX ORDER — DEXTROAMPHETAMINE SACCHARATE, AMPHETAMINE ASPARTATE, DEXTROAMPHETAMINE SULFATE AND AMPHETAMINE SULFATE 7.5; 7.5; 7.5; 7.5 MG/1; MG/1; MG/1; MG/1
30 TABLET ORAL 2 TIMES DAILY
Qty: 60 TABLET | Refills: 0 | Status: SHIPPED | OUTPATIENT
Start: 2023-09-19 | End: 2023-10-12 | Stop reason: SDUPTHER

## 2023-07-20 RX ORDER — DEXTROAMPHETAMINE SACCHARATE, AMPHETAMINE ASPARTATE, DEXTROAMPHETAMINE SULFATE AND AMPHETAMINE SULFATE 7.5; 7.5; 7.5; 7.5 MG/1; MG/1; MG/1; MG/1
30 TABLET ORAL 2 TIMES DAILY
Qty: 60 TABLET | Refills: 0 | Status: SHIPPED | OUTPATIENT
Start: 2023-07-20 | End: 2023-08-19

## 2023-07-20 RX ORDER — DEXTROAMPHETAMINE SACCHARATE, AMPHETAMINE ASPARTATE, DEXTROAMPHETAMINE SULFATE AND AMPHETAMINE SULFATE 7.5; 7.5; 7.5; 7.5 MG/1; MG/1; MG/1; MG/1
30 TABLET ORAL 2 TIMES DAILY
Qty: 60 TABLET | Refills: 0 | Status: SHIPPED | OUTPATIENT
Start: 2023-08-19 | End: 2023-08-23 | Stop reason: SDUPTHER

## 2023-07-20 NOTE — PROGRESS NOTES
SUBJECTIVE:    Patient ID: Nasima Lemus is a 22 y.o. female.    Chief Complaint: Follow-up (ADHD, brought bottles// SW)    This is a 21 yo female who presents today for regular checkup. Reports that she has done pretty well overall. She feels that the lexapro has caused her to be too carefree. Finds herself not really worried about anything. Was titrated up from 5mg to 10mg. Never tried any other ssri. When she was off of it she was mean and murcia. Very irritable. Knows that she benefits overall.      No visits with results within 6 Month(s) from this visit.   Latest known visit with results is:   Office Visit on 05/10/2022   Component Date Value Ref Range Status    Amphetamines 10/13/2022 POSITIVE (A)  <500 ng/mL Final    Amphetamine 10/13/2022 9,902 (H)  <250 ng/mL Final    Methamphetamine 10/13/2022 NEGATIVE  <250 ng/mL Final    Amphetamines Comments 10/13/2022    Final    Barbiturates 10/13/2022 NEGATIVE  <300 ng/mL Final    Benzodiazepines 10/13/2022 NEGATIVE  <100 ng/mL Final    Cocaine Metabolites 10/13/2022 NEGATIVE  <150 ng/mL Final    Methadone 10/13/2022 NEGATIVE  <100 ng/mL Final    Opiates 10/13/2022 NEGATIVE  <100 ng/mL Final    Oxycodone 10/13/2022 NEGATIVE  <100 ng/mL Final    Phencyclidine 10/13/2022 NEGATIVE  <25 ng/mL Final    Creatinine 10/13/2022 88.5  > or = 20.0 mg/dL Final    pH 10/13/2022 4.9  4.5 - 9.0 Final    Oxidants, Urine (Tox) 10/13/2022 NEGATIVE  <200 mcg/mL Final    Notes and Comments 10/13/2022    Final       Past Medical History:   Diagnosis Date    ADHD (attention deficit hyperactivity disorder)     Anxiety     Depression     Primary insomnia      Past Surgical History:   Procedure Laterality Date    APPENDECTOMY       Family History   Problem Relation Age of Onset    Cancer Mother     Alcohol abuse Father     Depression Father     No Known Problems Sister     Cancer Paternal Grandfather         prostate       Marital Status: Single  Alcohol History:  reports that she does  "not currently use alcohol.  Tobacco History:  reports that she has never smoked. She has never used smokeless tobacco.  Drug History:  reports no history of drug use.    Review of patient's allergies indicates:  No Known Allergies    Current Outpatient Medications:     metoprolol succinate (TOPROL-XL) 25 MG 24 hr tablet, Take 1 tablet (25 mg total) by mouth once daily., Disp: 30 tablet, Rfl: 2    citalopram (CELEXA) 10 MG tablet, Take 1 tablet (10 mg total) by mouth once daily., Disp: 30 tablet, Rfl: 5    dextroamphetamine-amphetamine 30 mg Tab, Take 1 tablet (30 mg total) by mouth 2 (two) times a day., Disp: 60 tablet, Rfl: 0    [START ON 8/19/2023] dextroamphetamine-amphetamine 30 mg Tab, Take 1 tablet (30 mg total) by mouth 2 (two) times a day., Disp: 60 tablet, Rfl: 0    [START ON 9/19/2023] dextroamphetamine-amphetamine 30 mg Tab, Take 1 tablet (30 mg total) by mouth 2 (two) times a day., Disp: 60 tablet, Rfl: 0    Review of Systems   Constitutional:  Negative for appetite change, chills, fatigue, fever and unexpected weight change.   HENT:  Negative for congestion.    Respiratory:  Negative for cough, chest tightness and shortness of breath.    Cardiovascular:  Negative for chest pain and palpitations.   Gastrointestinal:  Negative for abdominal distention and abdominal pain.   Endocrine: Negative for cold intolerance and heat intolerance.   Genitourinary:  Negative for difficulty urinating and dysuria.   Musculoskeletal:  Negative for arthralgias and back pain.   Neurological:  Negative for dizziness, weakness and headaches.   Psychiatric/Behavioral:  Positive for dysphoric mood.         Objective:      Vitals:    07/20/23 1519   BP: 136/86   Pulse: 80   Weight: 82.6 kg (182 lb)   Height: 4' 11" (1.499 m)     Physical Exam  Constitutional:       General: She is not in acute distress.     Appearance: She is well-developed.   HENT:      Head: Normocephalic and atraumatic.   Eyes:      Conjunctiva/sclera: " Conjunctivae normal.      Pupils: Pupils are equal, round, and reactive to light.   Neck:      Thyroid: No thyromegaly.   Cardiovascular:      Rate and Rhythm: Normal rate and regular rhythm.      Heart sounds: Normal heart sounds.   Pulmonary:      Effort: Pulmonary effort is normal.      Breath sounds: Normal breath sounds.   Abdominal:      General: Bowel sounds are normal. There is no distension.      Palpations: Abdomen is soft.      Tenderness: There is no abdominal tenderness.   Musculoskeletal:         General: Normal range of motion.      Cervical back: Normal range of motion and neck supple.   Skin:     General: Skin is warm and dry.      Findings: No erythema.   Neurological:      Mental Status: She is alert and oriented to person, place, and time.      Cranial Nerves: No cranial nerve deficit.         Assessment:       1. Anxiety    2. Attention deficit hyperactivity disorder (ADHD), predominantly inattentive type         Plan:       Anxiety  Comments:  trial with celexa now. suspect that she will do better overall with this than the lexapro.  Orders:  -     citalopram (CELEXA) 10 MG tablet; Take 1 tablet (10 mg total) by mouth once daily.  Dispense: 30 tablet; Refill: 5    Attention deficit hyperactivity disorder (ADHD), predominantly inattentive type  Comments:  refills of medication now. Doing well. Taking over from another prvider.  Orders:  -     dextroamphetamine-amphetamine 30 mg Tab; Take 1 tablet (30 mg total) by mouth 2 (two) times a day.  Dispense: 60 tablet; Refill: 0  -     dextroamphetamine-amphetamine 30 mg Tab; Take 1 tablet (30 mg total) by mouth 2 (two) times a day.  Dispense: 60 tablet; Refill: 0  -     dextroamphetamine-amphetamine 30 mg Tab; Take 1 tablet (30 mg total) by mouth 2 (two) times a day.  Dispense: 60 tablet; Refill: 0      Follow up in about 8 weeks (around 9/14/2023).        7/20/2023 Simone Crews PA-C

## 2023-07-20 NOTE — TELEPHONE ENCOUNTER
----- Message from Eugenio Iraheta MA sent at 7/20/2023  3:52 PM CDT -----  [phone] 8 week f/u  is needed with Simone please contact the patient when slot is assigned

## 2023-08-22 DIAGNOSIS — F90.0 ATTENTION DEFICIT HYPERACTIVITY DISORDER (ADHD), PREDOMINANTLY INATTENTIVE TYPE: ICD-10-CM

## 2023-08-22 DIAGNOSIS — R00.2 PALPITATIONS: ICD-10-CM

## 2023-08-22 RX ORDER — DEXTROAMPHETAMINE SACCHARATE, AMPHETAMINE ASPARTATE, DEXTROAMPHETAMINE SULFATE AND AMPHETAMINE SULFATE 7.5; 7.5; 7.5; 7.5 MG/1; MG/1; MG/1; MG/1
30 TABLET ORAL 2 TIMES DAILY
Qty: 60 TABLET | Refills: 0 | Status: CANCELLED | OUTPATIENT
Start: 2023-08-22 | End: 2023-09-21

## 2023-08-23 ENCOUNTER — PATIENT MESSAGE (OUTPATIENT)
Dept: FAMILY MEDICINE | Facility: CLINIC | Age: 23
End: 2023-08-23

## 2023-08-23 DIAGNOSIS — F90.0 ATTENTION DEFICIT HYPERACTIVITY DISORDER (ADHD), PREDOMINANTLY INATTENTIVE TYPE: ICD-10-CM

## 2023-08-23 RX ORDER — METOPROLOL SUCCINATE 25 MG/1
25 TABLET, EXTENDED RELEASE ORAL DAILY
Qty: 30 TABLET | Refills: 2 | Status: SHIPPED | OUTPATIENT
Start: 2023-08-23 | End: 2023-10-12 | Stop reason: SDUPTHER

## 2023-08-23 RX ORDER — DEXTROAMPHETAMINE SACCHARATE, AMPHETAMINE ASPARTATE, DEXTROAMPHETAMINE SULFATE AND AMPHETAMINE SULFATE 7.5; 7.5; 7.5; 7.5 MG/1; MG/1; MG/1; MG/1
30 TABLET ORAL 2 TIMES DAILY
Qty: 60 TABLET | Refills: 0 | Status: SHIPPED | OUTPATIENT
Start: 2023-08-23 | End: 2023-10-12 | Stop reason: SDUPTHER

## 2023-08-23 NOTE — TELEPHONE ENCOUNTER
Pt is needing a refill on her Adderall. Last office visit 07/20/2023. Next office visit 10/12/2023.      Looks like that the pt has a prescription at the pharmacy already.

## 2023-10-12 ENCOUNTER — OFFICE VISIT (OUTPATIENT)
Dept: FAMILY MEDICINE | Facility: CLINIC | Age: 23
End: 2023-10-12
Payer: COMMERCIAL

## 2023-10-12 VITALS
DIASTOLIC BLOOD PRESSURE: 88 MMHG | BODY MASS INDEX: 37.05 KG/M2 | HEART RATE: 78 BPM | SYSTOLIC BLOOD PRESSURE: 134 MMHG | HEIGHT: 59 IN | WEIGHT: 183.81 LBS | OXYGEN SATURATION: 99 %

## 2023-10-12 DIAGNOSIS — R00.2 PALPITATIONS: ICD-10-CM

## 2023-10-12 DIAGNOSIS — F90.0 ATTENTION DEFICIT HYPERACTIVITY DISORDER (ADHD), PREDOMINANTLY INATTENTIVE TYPE: Primary | ICD-10-CM

## 2023-10-12 DIAGNOSIS — F41.9 ANXIETY: ICD-10-CM

## 2023-10-12 PROCEDURE — 3079F PR MOST RECENT DIASTOLIC BLOOD PRESSURE 80-89 MM HG: ICD-10-PCS | Mod: CPTII,S$GLB,, | Performed by: PHYSICIAN ASSISTANT

## 2023-10-12 PROCEDURE — 3008F BODY MASS INDEX DOCD: CPT | Mod: CPTII,S$GLB,, | Performed by: PHYSICIAN ASSISTANT

## 2023-10-12 PROCEDURE — 1159F PR MEDICATION LIST DOCUMENTED IN MEDICAL RECORD: ICD-10-PCS | Mod: CPTII,S$GLB,, | Performed by: PHYSICIAN ASSISTANT

## 2023-10-12 PROCEDURE — 3079F DIAST BP 80-89 MM HG: CPT | Mod: CPTII,S$GLB,, | Performed by: PHYSICIAN ASSISTANT

## 2023-10-12 PROCEDURE — 3008F PR BODY MASS INDEX (BMI) DOCUMENTED: ICD-10-PCS | Mod: CPTII,S$GLB,, | Performed by: PHYSICIAN ASSISTANT

## 2023-10-12 PROCEDURE — 3075F PR MOST RECENT SYSTOLIC BLOOD PRESS GE 130-139MM HG: ICD-10-PCS | Mod: CPTII,S$GLB,, | Performed by: PHYSICIAN ASSISTANT

## 2023-10-12 PROCEDURE — 99214 PR OFFICE/OUTPT VISIT, EST, LEVL IV, 30-39 MIN: ICD-10-PCS | Mod: S$GLB,,, | Performed by: PHYSICIAN ASSISTANT

## 2023-10-12 PROCEDURE — 1159F MED LIST DOCD IN RCRD: CPT | Mod: CPTII,S$GLB,, | Performed by: PHYSICIAN ASSISTANT

## 2023-10-12 PROCEDURE — 3075F SYST BP GE 130 - 139MM HG: CPT | Mod: CPTII,S$GLB,, | Performed by: PHYSICIAN ASSISTANT

## 2023-10-12 PROCEDURE — 99214 OFFICE O/P EST MOD 30 MIN: CPT | Mod: S$GLB,,, | Performed by: PHYSICIAN ASSISTANT

## 2023-10-12 RX ORDER — DEXTROAMPHETAMINE SACCHARATE, AMPHETAMINE ASPARTATE, DEXTROAMPHETAMINE SULFATE AND AMPHETAMINE SULFATE 7.5; 7.5; 7.5; 7.5 MG/1; MG/1; MG/1; MG/1
30 TABLET ORAL 2 TIMES DAILY
Qty: 60 TABLET | Refills: 0 | Status: SHIPPED | OUTPATIENT
Start: 2023-11-12 | End: 2023-12-12

## 2023-10-12 RX ORDER — DEXTROAMPHETAMINE SACCHARATE, AMPHETAMINE ASPARTATE, DEXTROAMPHETAMINE SULFATE AND AMPHETAMINE SULFATE 7.5; 7.5; 7.5; 7.5 MG/1; MG/1; MG/1; MG/1
30 TABLET ORAL 2 TIMES DAILY
Qty: 60 TABLET | Refills: 0 | Status: SHIPPED | OUTPATIENT
Start: 2023-10-12 | End: 2023-11-11

## 2023-10-12 RX ORDER — METOPROLOL SUCCINATE 25 MG/1
50 TABLET, EXTENDED RELEASE ORAL DAILY
Qty: 180 TABLET | Refills: 1 | Status: SHIPPED | OUTPATIENT
Start: 2023-10-12 | End: 2024-04-09

## 2023-10-12 RX ORDER — DEXTROAMPHETAMINE SACCHARATE, AMPHETAMINE ASPARTATE, DEXTROAMPHETAMINE SULFATE AND AMPHETAMINE SULFATE 7.5; 7.5; 7.5; 7.5 MG/1; MG/1; MG/1; MG/1
30 TABLET ORAL 2 TIMES DAILY
Qty: 60 TABLET | Refills: 0 | Status: SHIPPED | OUTPATIENT
Start: 2023-12-11 | End: 2024-01-31 | Stop reason: SDUPTHER

## 2023-10-12 NOTE — PROGRESS NOTES
SUBJECTIVE:    Patient ID: Nasima Lemus is a 22 y.o. female.    Chief Complaint: Follow-up (Brought bottles//pt is here for check up//pt had flu vaccine at [facility])    This is a 21 yo female who presents for regular checkup. Reports that she has been doing well. No new concerns from a medical standpoint. She is enjoying her job over at [employer]. Happy with adderall dose. Celexa good for mood and anxiety. Just had covid a few weeks ago. Feeling fine now.         No visits with results within 6 Month(s) from this visit.   Latest known visit with results is:   Office Visit on 05/10/2022   Component Date Value Ref Range Status    Amphetamines 10/13/2022 POSITIVE (A)  <500 ng/mL Final    Amphetamine 10/13/2022 9,902 (H)  <250 ng/mL Final    Methamphetamine 10/13/2022 NEGATIVE  <250 ng/mL Final    Amphetamines Comments 10/13/2022    Final    Barbiturates 10/13/2022 NEGATIVE  <300 ng/mL Final    Benzodiazepines 10/13/2022 NEGATIVE  <100 ng/mL Final    Cocaine Metabolites 10/13/2022 NEGATIVE  <150 ng/mL Final    Methadone 10/13/2022 NEGATIVE  <100 ng/mL Final    Opiates 10/13/2022 NEGATIVE  <100 ng/mL Final    Oxycodone 10/13/2022 NEGATIVE  <100 ng/mL Final    Phencyclidine 10/13/2022 NEGATIVE  <25 ng/mL Final    Creatinine 10/13/2022 88.5  > or = 20.0 mg/dL Final    pH 10/13/2022 4.9  4.5 - 9.0 Final    Oxidants, Urine (Tox) 10/13/2022 NEGATIVE  <200 mcg/mL Final    Notes and Comments 10/13/2022    Final       Past Medical History:   Diagnosis Date    ADHD (attention deficit hyperactivity disorder)     Anxiety     Depression     Primary insomnia      Past Surgical History:   Procedure Laterality Date    APPENDECTOMY       Family History   Problem Relation Age of Onset    Cancer Mother     Alcohol abuse Father     Depression Father     No Known Problems Sister     Cancer Paternal Grandfather         prostate       Marital Status:   Alcohol History:  reports that she does not currently use  "alcohol.  Tobacco History:  reports that she has never smoked. She has never used smokeless tobacco.  Drug History:  reports no history of drug use.    Review of patient's allergies indicates:  No Known Allergies    Current Outpatient Medications:     citalopram (CELEXA) 10 MG tablet, Take 1 tablet (10 mg total) by mouth once daily., Disp: 30 tablet, Rfl: 5    dextroamphetamine-amphetamine 30 mg Tab, Take 1 tablet (30 mg total) by mouth 2 (two) times a day., Disp: 60 tablet, Rfl: 0    [START ON 12/11/2023] dextroamphetamine-amphetamine 30 mg Tab, Take 1 tablet (30 mg total) by mouth 2 (two) times a day., Disp: 60 tablet, Rfl: 0    [START ON 11/12/2023] dextroamphetamine-amphetamine 30 mg Tab, Take 1 tablet (30 mg total) by mouth 2 (two) times a day., Disp: 60 tablet, Rfl: 0    metoprolol succinate (TOPROL-XL) 25 MG 24 hr tablet, Take 2 tablets (50 mg total) by mouth once daily., Disp: 180 tablet, Rfl: 1    Review of Systems   Constitutional:  Negative for appetite change, chills, fatigue, fever and unexpected weight change.   HENT:  Negative for congestion.    Respiratory:  Negative for cough, chest tightness and shortness of breath.    Cardiovascular:  Negative for chest pain and palpitations.   Gastrointestinal:  Negative for abdominal distention and abdominal pain.   Endocrine: Negative for cold intolerance and heat intolerance.   Genitourinary:  Negative for difficulty urinating and dysuria.   Musculoskeletal:  Negative for arthralgias and back pain.   Neurological:  Negative for dizziness, weakness and headaches.   Psychiatric/Behavioral:  Positive for dysphoric mood.           Objective:      Vitals:    10/12/23 1529 10/12/23 1534 10/12/23 1627   BP: (!) 130/100 (!) 136/94 134/88   Pulse: 94  78   SpO2: 99%     Weight: 83.4 kg (183 lb 12.8 oz)     Height: 4' 11" (1.499 m)       Physical Exam  Constitutional:       General: She is not in acute distress.     Appearance: She is well-developed.   HENT:      Head: " Normocephalic and atraumatic.   Eyes:      Conjunctiva/sclera: Conjunctivae normal.      Pupils: Pupils are equal, round, and reactive to light.   Neck:      Thyroid: No thyromegaly.   Cardiovascular:      Rate and Rhythm: Normal rate and regular rhythm.      Heart sounds: Normal heart sounds.   Pulmonary:      Effort: Pulmonary effort is normal.      Breath sounds: Normal breath sounds.   Abdominal:      General: Bowel sounds are normal. There is no distension.      Palpations: Abdomen is soft.      Tenderness: There is no abdominal tenderness.   Musculoskeletal:         General: Normal range of motion.      Cervical back: Normal range of motion and neck supple.   Skin:     General: Skin is warm and dry.      Findings: No erythema.   Neurological:      Mental Status: She is alert and oriented to person, place, and time.      Cranial Nerves: No cranial nerve deficit.           Assessment:       1. Attention deficit hyperactivity disorder (ADHD), predominantly inattentive type    2. Palpitations    3. Anxiety         Plan:       Attention deficit hyperactivity disorder (ADHD), predominantly inattentive type  Comments:  refills of medication now. Doing well. Taking over from another prvider.  Orders:  -     dextroamphetamine-amphetamine 30 mg Tab; Take 1 tablet (30 mg total) by mouth 2 (two) times a day.  Dispense: 60 tablet; Refill: 0  -     dextroamphetamine-amphetamine 30 mg Tab; Take 1 tablet (30 mg total) by mouth 2 (two) times a day.  Dispense: 60 tablet; Refill: 0  -     dextroamphetamine-amphetamine 30 mg Tab; Take 1 tablet (30 mg total) by mouth 2 (two) times a day.  Dispense: 60 tablet; Refill: 0    Palpitations  Comments:  MUCH better controlled. given pressure not at goal will increase dose now to 50mg  Orders:  -     metoprolol succinate (TOPROL-XL) 25 MG 24 hr tablet; Take 2 tablets (50 mg total) by mouth once daily.  Dispense: 180 tablet; Refill: 1    Anxiety  Comments:  mood and anxiety is stable at  this time. continue current dose.      Follow up in about 6 months (around 4/12/2024).        10/12/2023 Simone Crews PA-C

## 2023-12-28 ENCOUNTER — PATIENT MESSAGE (OUTPATIENT)
Dept: FAMILY MEDICINE | Facility: CLINIC | Age: 23
End: 2023-12-28
Payer: COMMERCIAL

## 2023-12-28 DIAGNOSIS — N39.0 URINARY TRACT INFECTION WITHOUT HEMATURIA, SITE UNSPECIFIED: Primary | ICD-10-CM

## 2023-12-29 RX ORDER — SULFAMETHOXAZOLE AND TRIMETHOPRIM 800; 160 MG/1; MG/1
1 TABLET ORAL 2 TIMES DAILY
Qty: 10 TABLET | Refills: 0 | Status: SHIPPED | OUTPATIENT
Start: 2023-12-29

## 2024-01-03 ENCOUNTER — OFFICE VISIT (OUTPATIENT)
Dept: URGENT CARE | Facility: CLINIC | Age: 24
End: 2024-01-03
Payer: COMMERCIAL

## 2024-01-03 VITALS
TEMPERATURE: 98 F | OXYGEN SATURATION: 96 % | HEART RATE: 92 BPM | SYSTOLIC BLOOD PRESSURE: 125 MMHG | DIASTOLIC BLOOD PRESSURE: 93 MMHG | HEIGHT: 59 IN | RESPIRATION RATE: 16 BRPM | BODY MASS INDEX: 34.27 KG/M2 | WEIGHT: 170 LBS

## 2024-01-03 DIAGNOSIS — R30.0 DYSURIA: Primary | ICD-10-CM

## 2024-01-03 DIAGNOSIS — N30.00 ACUTE CYSTITIS WITHOUT HEMATURIA: ICD-10-CM

## 2024-01-03 DIAGNOSIS — H66.92 ACUTE OTITIS MEDIA, LEFT: ICD-10-CM

## 2024-01-03 LAB
B-HCG UR QL: NEGATIVE
BILIRUB UR QL STRIP: POSITIVE
CTP QC/QA: YES
GLUCOSE UR QL STRIP: NEGATIVE
KETONES UR QL STRIP: POSITIVE
LEUKOCYTE ESTERASE UR QL STRIP: POSITIVE
PH, POC UA: 5
POC BLOOD, URINE: NEGATIVE
POC NITRATES, URINE: POSITIVE
PROT UR QL STRIP: NEGATIVE
SP GR UR STRIP: 1.02 (ref 1–1.03)
UROBILINOGEN UR STRIP-ACNC: 2 (ref 0.1–1.1)

## 2024-01-03 PROCEDURE — 99203 OFFICE O/P NEW LOW 30 MIN: CPT | Mod: 25,TIER,S$GLB, | Performed by: STUDENT IN AN ORGANIZED HEALTH CARE EDUCATION/TRAINING PROGRAM

## 2024-01-03 PROCEDURE — 81025 URINE PREGNANCY TEST: CPT | Mod: S$GLB,,, | Performed by: STUDENT IN AN ORGANIZED HEALTH CARE EDUCATION/TRAINING PROGRAM

## 2024-01-03 PROCEDURE — 81003 URINALYSIS AUTO W/O SCOPE: CPT | Mod: QW,S$GLB,, | Performed by: STUDENT IN AN ORGANIZED HEALTH CARE EDUCATION/TRAINING PROGRAM

## 2024-01-03 PROCEDURE — 96372 THER/PROPH/DIAG INJ SC/IM: CPT | Mod: S$GLB,,, | Performed by: STUDENT IN AN ORGANIZED HEALTH CARE EDUCATION/TRAINING PROGRAM

## 2024-01-03 RX ORDER — PHENAZOPYRIDINE HYDROCHLORIDE 200 MG/1
200 TABLET, FILM COATED ORAL 3 TIMES DAILY PRN
Qty: 9 TABLET | Refills: 0 | Status: SHIPPED | OUTPATIENT
Start: 2024-01-03

## 2024-01-03 RX ORDER — CEFTRIAXONE 1 G/1
1 INJECTION, POWDER, FOR SOLUTION INTRAMUSCULAR; INTRAVENOUS
Status: COMPLETED | OUTPATIENT
Start: 2024-01-03 | End: 2024-01-03

## 2024-01-03 RX ORDER — AMOXICILLIN AND CLAVULANATE POTASSIUM 875; 125 MG/1; MG/1
1 TABLET, FILM COATED ORAL EVERY 12 HOURS
Qty: 20 TABLET | Refills: 0 | Status: SHIPPED | OUTPATIENT
Start: 2024-01-03 | End: 2024-01-13

## 2024-01-03 RX ADMIN — CEFTRIAXONE 1 G: 1 INJECTION, POWDER, FOR SOLUTION INTRAMUSCULAR; INTRAVENOUS at 05:01

## 2024-01-03 NOTE — PROGRESS NOTES
"Subjective:      Patient ID: Nasima Otoole is a 23 y.o. female.    Vitals:  height is 4' 11" (1.499 m) and weight is 77.1 kg (170 lb). Her oral temperature is 98.2 °F (36.8 °C). Her blood pressure is 125/93 (abnormal) and her pulse is 92. Her respiration is 16 and oxygen saturation is 96%.     Chief Complaint: Otalgia and Urinary Tract Infection    Patient is a 23-year-old female with a past medical history of ADHD, anxiety, depression, and insomnia who presents to clinic for evaluation of 2 complaints.  Patient reports the 1st is that of a possible UTI.  Patient states the 2nd is that of possible ear infection.  Patient reports urinary symptoms for 1 week now.  Patient states previously placed on Bactrim twice daily for 5 days via her PCP.  Patient states that symptoms have not greatly improved.  Patient states no concern for STI or pregnancy.  Patient states that she has experienced dysuria, urinary frequency, and urinary urgency.  Patient states she has not had any urinary hesitancy or retention, flank pain or pelvic pain, vaginal discharge, vaginal odor, vaginal bleeding, abdominal pain, nausea or vomiting, fever or chills.  Patient states the 2nd is that of the ear infection.  Patient states experiences constant pain and intermittent stabbing sensation to the in her left ear.  Patient states that she has not had any trauma or injury to the ears.  Patient denies get any fluid within ears.  Patient states that she has not experienced any drainage from ear, tinnitus or hearing loss.  Patient states no over-the-counter medications for these symptoms at current.  Patient states that she has not had any associated headaches or dizziness, fever or chills.    Otalgia   There is pain in the left ear. This is a new problem. The current episode started yesterday. The problem has been unchanged. There has been no fever. Pertinent negatives include no abdominal pain, coughing, diarrhea, ear discharge, headaches, hearing " loss, rash, sore throat or vomiting.   Urinary Tract Infection   This is a new problem. The current episode started in the past 7 days. The problem occurs every urination. The problem has been unchanged. The quality of the pain is described as burning. Associated symptoms include frequency and urgency. Pertinent negatives include no flank pain, nausea, vomiting or rash.       HENT:  Positive for ear pain. Negative for ear discharge, tinnitus, hearing loss, congestion and sore throat.    Neck: neck negative.   Cardiovascular: Negative.  Negative for chest pain and palpitations.   Eyes: Negative.    Respiratory: Negative.  Negative for chest tightness, cough and shortness of breath.    Gastrointestinal: Negative.  Negative for abdominal trauma, abdominal pain, nausea, vomiting and diarrhea.   Endocrine: negative.   Genitourinary:  Positive for dysuria, frequency and urgency. Negative for urine decreased, flank pain, vaginal discharge, vaginal bleeding, vaginal odor and pelvic pain.   Musculoskeletal: Negative.  Negative for back pain and muscle ache.   Skin: Negative.  Negative for color change, pale, rash and erythema.   Allergic/Immunologic: Negative.    Neurological: Negative.  Negative for dizziness, light-headedness, passing out, headaches, disorientation and altered mental status.   Hematologic/Lymphatic: Negative.    Psychiatric/Behavioral: Negative.  Negative for altered mental status, disorientation and confusion.       Objective:     Physical Exam   Constitutional: She is oriented to person, place, and time. She appears well-developed. She is cooperative.  Non-toxic appearance. She does not appear ill. No distress.   HENT:   Head: Normocephalic and atraumatic.   Ears:   Right Ear: Hearing, tympanic membrane, external ear and ear canal normal.   Left Ear: Hearing, external ear and ear canal normal. No no drainage, swelling or tenderness. Tympanic membrane is erythematous and bulging. Tympanic membrane is not  perforated. No decreased hearing is noted.   Nose: Nose normal. No mucosal edema, rhinorrhea, nasal deformity or congestion. No epistaxis. Right sinus exhibits no maxillary sinus tenderness and no frontal sinus tenderness. Left sinus exhibits no maxillary sinus tenderness and no frontal sinus tenderness.   Mouth/Throat: Uvula is midline, oropharynx is clear and moist and mucous membranes are normal. Mucous membranes are moist. No trismus in the jaw. Normal dentition. No uvula swelling. No oropharyngeal exudate or posterior oropharyngeal erythema. Oropharynx is clear.   Eyes: Conjunctivae and lids are normal. Pupils are equal, round, and reactive to light. Right eye exhibits no discharge. Left eye exhibits no discharge. No scleral icterus.   Neck: Trachea normal and phonation normal. Neck supple. No neck rigidity present.   Cardiovascular: Normal rate, regular rhythm, normal heart sounds and normal pulses.   Pulmonary/Chest: Effort normal and breath sounds normal. No respiratory distress. She has no wheezes. She has no rhonchi. She has no rales.   Abdominal: Normal appearance and bowel sounds are normal. She exhibits no distension. Soft. There is no abdominal tenderness. There is no rebound, no guarding, no left CVA tenderness and no right CVA tenderness.   Musculoskeletal: Normal range of motion.         General: Normal range of motion.      Cervical back: She exhibits no tenderness.   Lymphadenopathy:     She has no cervical adenopathy.   Neurological: She is alert and oriented to person, place, and time. She exhibits normal muscle tone.   Skin: Skin is warm, dry, intact, not diaphoretic, not pale and no rash. Capillary refill takes less than 2 seconds. No erythema   Psychiatric: Her speech is normal and behavior is normal. Judgment and thought content normal.   Nursing note and vitals reviewed.      Assessment:     1. Dysuria    2. Acute otitis media, left    3. Acute cystitis without hematuria        Plan:        Dysuria  -     POCT Urinalysis, Dipstick, Automated, W/O Scope  -     POCT urine pregnancy  -     Urine culture    Acute otitis media, left    Acute cystitis without hematuria    Other orders  -     cefTRIAXone injection 1 g  -     amoxicillin-clavulanate 875-125mg (AUGMENTIN) 875-125 mg per tablet; Take 1 tablet by mouth every 12 (twelve) hours. for 10 days  Dispense: 20 tablet; Refill: 0  -     phenazopyridine (PYRIDIUM) 200 MG tablet; Take 1 tablet (200 mg total) by mouth 3 (three) times daily as needed for Pain.  Dispense: 9 tablet; Refill: 0                Labs:  UA:  Negative blood, positive nitrate, positive leukocyte  UPT:  Negative   Urine culture ordered, will call with results.  Rocephin 1 g IM in clinic.  Patient tolerated well.  No complications noted.    Discontinue Bactrim and start medications as prescribed.    Tylenol/Motrin per package instructions for any pain or fever.    Assure adequate hydration.    Follow-up with PCP in 1-2 days.    Return to clinic as needed.    To ED for any new or acutely worsening symptoms.    Patient in agreement with plan of care.    DISCLAIMER: Please note that my documentation in this Electronic Healthcare Record was produced using speech recognition software and therefore may contain errors related to that software system.These could include grammar, punctuation and spelling errors or the inclusion/exclusion of phrases that were not intended. Garbled syntax, mangled pronouns, and other bizarre constructions may be attributed to that software system.

## 2024-01-10 LAB
BACTERIA UR CULT: ABNORMAL
BACTERIA UR CULT: ABNORMAL

## 2024-01-10 RX ORDER — FLUCONAZOLE 150 MG/1
150 TABLET ORAL
Qty: 2 TABLET | Refills: 0 | Status: SHIPPED | OUTPATIENT
Start: 2024-01-10

## 2024-01-31 DIAGNOSIS — F90.0 ATTENTION DEFICIT HYPERACTIVITY DISORDER (ADHD), PREDOMINANTLY INATTENTIVE TYPE: ICD-10-CM

## 2024-01-31 RX ORDER — DEXTROAMPHETAMINE SACCHARATE, AMPHETAMINE ASPARTATE, DEXTROAMPHETAMINE SULFATE AND AMPHETAMINE SULFATE 7.5; 7.5; 7.5; 7.5 MG/1; MG/1; MG/1; MG/1
30 TABLET ORAL 2 TIMES DAILY
Qty: 60 TABLET | Refills: 0 | Status: SHIPPED | OUTPATIENT
Start: 2024-01-31 | End: 2024-03-11 | Stop reason: SDUPTHER

## 2024-03-11 DIAGNOSIS — F90.0 ATTENTION DEFICIT HYPERACTIVITY DISORDER (ADHD), PREDOMINANTLY INATTENTIVE TYPE: ICD-10-CM

## 2024-03-12 RX ORDER — DEXTROAMPHETAMINE SACCHARATE, AMPHETAMINE ASPARTATE, DEXTROAMPHETAMINE SULFATE AND AMPHETAMINE SULFATE 7.5; 7.5; 7.5; 7.5 MG/1; MG/1; MG/1; MG/1
30 TABLET ORAL 2 TIMES DAILY
Qty: 60 TABLET | Refills: 0 | Status: SHIPPED | OUTPATIENT
Start: 2024-03-12 | End: 2024-04-29 | Stop reason: SDUPTHER

## 2024-03-13 ENCOUNTER — TELEPHONE (OUTPATIENT)
Dept: OBSTETRICS AND GYNECOLOGY | Facility: CLINIC | Age: 24
End: 2024-03-13
Payer: COMMERCIAL

## 2024-03-13 NOTE — TELEPHONE ENCOUNTER
----- Message from Maria Luisa Martinez sent at 3/13/2024 11:20 AM CDT -----  Type:  Reschedule Appointment Request    Caller is requesting a sooner appointment.  Caller declined first available appointment listed below.  Caller will not accept being placed on the waitlist and is requesting a message be sent to doctor.  Name of Caller:pt  When is the first available appointment?books closed   Symptoms: annual   Would the patient rather a call back or a response via [facility]? call  Best Call Back Number:[phone]   Additional Information:

## 2024-04-04 ENCOUNTER — LAB VISIT (OUTPATIENT)
Dept: LAB | Facility: HOSPITAL | Age: 24
End: 2024-04-04
Attending: OBSTETRICS & GYNECOLOGY
Payer: COMMERCIAL

## 2024-04-04 ENCOUNTER — OFFICE VISIT (OUTPATIENT)
Dept: OBSTETRICS AND GYNECOLOGY | Facility: CLINIC | Age: 24
End: 2024-04-04
Payer: COMMERCIAL

## 2024-04-04 VITALS
WEIGHT: 179.56 LBS | BODY MASS INDEX: 36.27 KG/M2 | DIASTOLIC BLOOD PRESSURE: 80 MMHG | SYSTOLIC BLOOD PRESSURE: 130 MMHG

## 2024-04-04 DIAGNOSIS — Z84.2 FAMILY HISTORY OF PCOS: ICD-10-CM

## 2024-04-04 DIAGNOSIS — Z12.4 SCREENING FOR CERVICAL CANCER: ICD-10-CM

## 2024-04-04 DIAGNOSIS — Z84.2 FAMILY HISTORY OF PCOS: Primary | ICD-10-CM

## 2024-04-04 PROCEDURE — 99385 PREV VISIT NEW AGE 18-39: CPT | Mod: S$GLB,,, | Performed by: OBSTETRICS & GYNECOLOGY

## 2024-04-04 PROCEDURE — 88175 CYTOPATH C/V AUTO FLUID REDO: CPT | Performed by: PATHOLOGY

## 2024-04-04 PROCEDURE — 3075F SYST BP GE 130 - 139MM HG: CPT | Mod: CPTII,S$GLB,, | Performed by: OBSTETRICS & GYNECOLOGY

## 2024-04-04 PROCEDURE — 88141 CYTOPATH C/V INTERPRET: CPT | Mod: ,,, | Performed by: PATHOLOGY

## 2024-04-04 PROCEDURE — 80053 COMPREHEN METABOLIC PANEL: CPT | Performed by: OBSTETRICS & GYNECOLOGY

## 2024-04-04 PROCEDURE — 99999 PR PBB SHADOW E&M-EST. PATIENT-LVL II: CPT | Mod: PBBFAC,,, | Performed by: OBSTETRICS & GYNECOLOGY

## 2024-04-04 PROCEDURE — 36415 COLL VENOUS BLD VENIPUNCTURE: CPT | Mod: PN | Performed by: OBSTETRICS & GYNECOLOGY

## 2024-04-04 PROCEDURE — 84443 ASSAY THYROID STIM HORMONE: CPT | Performed by: OBSTETRICS & GYNECOLOGY

## 2024-04-04 PROCEDURE — 84146 ASSAY OF PROLACTIN: CPT | Performed by: OBSTETRICS & GYNECOLOGY

## 2024-04-04 PROCEDURE — 3008F BODY MASS INDEX DOCD: CPT | Mod: CPTII,S$GLB,, | Performed by: OBSTETRICS & GYNECOLOGY

## 2024-04-04 PROCEDURE — 85025 COMPLETE CBC W/AUTO DIFF WBC: CPT | Performed by: OBSTETRICS & GYNECOLOGY

## 2024-04-04 PROCEDURE — 87624 HPV HI-RISK TYP POOLED RSLT: CPT | Performed by: OBSTETRICS & GYNECOLOGY

## 2024-04-04 PROCEDURE — 82627 DEHYDROEPIANDROSTERONE: CPT | Performed by: OBSTETRICS & GYNECOLOGY

## 2024-04-04 PROCEDURE — 84403 ASSAY OF TOTAL TESTOSTERONE: CPT | Performed by: OBSTETRICS & GYNECOLOGY

## 2024-04-04 PROCEDURE — 3079F DIAST BP 80-89 MM HG: CPT | Mod: CPTII,S$GLB,, | Performed by: OBSTETRICS & GYNECOLOGY

## 2024-04-04 NOTE — PROGRESS NOTES
HPI:   23 y.o.   OB History          0    Para   0    Term   0       0    AB   0    Living   0         SAB   0    IAB   0    Ectopic   0    Multiple   0    Live Births   0              Patient's last menstrual period was 2024.    Patient is  here for her annual gynecologic exam.  She has no complaints.     ROS:  GENERAL: No fever, chills, fatigability or weight loss.  SKIN: No rashes, itching or changes in color or texture of skin.  HEAD: No headaches or recent head trauma.  EYES: Visual acuity fine. No photophobia, ocular pain or diplopia.  EARS: Denies ear pain, discharge or vertigo.  NOSE: No loss of smell, no epistaxis or postnasal drip.  MOUTH & THROAT: No hoarseness or change in voice. No excessive gum bleeding.  NODES: Denies swollen glands.  CHEST: Denies GARRETT, cyanosis, wheezing, cough and sputum production.  CARDIOVASCULAR: Denies chest pain, PND, orthopnea or reduced exercise tolerance.  ABDOMEN: Appetite fine. No weight loss. Denies diarrhea, abdominal pain, hematemesis or blood in stool.  URINARY: No flank pain, dysuria or hematuria.  PERIPHERAL VASCULAR: No claudication or cyanosis.  MUSCULOSKELETAL: No joint stiffness or swelling. Denies back pain.  NEUROLOGIC: No history of seizures, paralysis, alteration of gait or coordination.    PE:   /80   Wt 81.4 kg (179 lb 9 oz)   LMP 2024   BMI 36.27 kg/m²   APPEARANCE: Well nourished, well developed, in no acute distress.  NECK: Neck symmetric without masses or thyromegaly.  BREASTS: Symmetrical, no skin changes or visible lesions. No palpable masses, nipple discharge or adenopathy bilaterally.  ABDOMEN: Flat. Soft. No tenderness or masses. No hepatosplenomegaly. No hernias. No CVA tenderness.  VULVA: No lesions. Normal female genitalia.  URETHRAL MEATUS: Normal size and location, no lesions, no prolapse.  URETHRA: No masses, tenderness, prolapse or scarring.  VAGINA: Moist and well rugated, no discharge, no significant  cystocele or rectocele.  CERVIX: No lesions and discharge. PAP done.  UTERUS: Normal size, regular shape, mobile, non-tender, bladder base nontender.  ADNEXA: No masses, tenderness or CDS nodularity.  ANUS PERINEUM: Normal.    PROCEDURES:  Pap smear    Assessment:  Normal Gynecologic Exam    Plan:  Mammogram and Colonoscopy if indicated by current recommendations.  Return to clinic in one year or for any problems or complaints.  Tryin 7 mo for pregnancy  Cycles 33-35 days  Neg gyn hx  Ovulating late, ie day 15-17 geovanny  Will start with labs, dic other rx fert pills,   Fh pcos  Day 21 would be near Monday 8, will do labs few days later   Meds for htn, anxiety,

## 2024-04-11 LAB
BASOPHILS # BLD AUTO: 0.03 K/UL (ref 0–0.2)
BASOPHILS NFR BLD: 0.4 % (ref 0–1.9)
DIFFERENTIAL METHOD BLD: NORMAL
EOSINOPHIL # BLD AUTO: 0.3 K/UL (ref 0–0.5)
EOSINOPHIL NFR BLD: 3.8 % (ref 0–8)
ERYTHROCYTE [DISTWIDTH] IN BLOOD BY AUTOMATED COUNT: 13.1 % (ref 11.5–14.5)
FINAL PATHOLOGIC DIAGNOSIS: ABNORMAL
HCT VFR BLD AUTO: 43.4 % (ref 37–48.5)
HGB BLD-MCNC: 14.4 G/DL (ref 12–16)
IMM GRANULOCYTES # BLD AUTO: 0.02 K/UL (ref 0–0.04)
IMM GRANULOCYTES NFR BLD AUTO: 0.3 % (ref 0–0.5)
LYMPHOCYTES # BLD AUTO: 3.4 K/UL (ref 1–4.8)
LYMPHOCYTES NFR BLD: 43.1 % (ref 18–48)
Lab: ABNORMAL
MCH RBC QN AUTO: 28.6 PG (ref 27–31)
MCHC RBC AUTO-ENTMCNC: 33.2 G/DL (ref 32–36)
MCV RBC AUTO: 86 FL (ref 82–98)
MONOCYTES # BLD AUTO: 0.6 K/UL (ref 0.3–1)
MONOCYTES NFR BLD: 7.8 % (ref 4–15)
NEUTROPHILS # BLD AUTO: 3.5 K/UL (ref 1.8–7.7)
NEUTROPHILS NFR BLD: 44.6 % (ref 38–73)
NRBC BLD-RTO: 0 /100 WBC
PLATELET # BLD AUTO: 306 K/UL (ref 150–450)
PMV BLD AUTO: 10.5 FL (ref 9.2–12.9)
RBC # BLD AUTO: 5.04 M/UL (ref 4–5.4)
WBC # BLD AUTO: 7.82 K/UL (ref 3.9–12.7)

## 2024-04-12 ENCOUNTER — PATIENT MESSAGE (OUTPATIENT)
Dept: OBSTETRICS AND GYNECOLOGY | Facility: CLINIC | Age: 24
End: 2024-04-12
Payer: COMMERCIAL

## 2024-04-12 LAB
ALBUMIN SERPL BCP-MCNC: 3.9 G/DL (ref 3.5–5.2)
ALP SERPL-CCNC: 92 U/L (ref 55–135)
ALT SERPL W/O P-5'-P-CCNC: 17 U/L (ref 10–44)
ANION GAP SERPL CALC-SCNC: 9 MMOL/L (ref 8–16)
AST SERPL-CCNC: 21 U/L (ref 10–40)
BILIRUB SERPL-MCNC: 0.6 MG/DL (ref 0.1–1)
BUN SERPL-MCNC: 11 MG/DL (ref 6–20)
CALCIUM SERPL-MCNC: 9.7 MG/DL (ref 8.7–10.5)
CHLORIDE SERPL-SCNC: 106 MMOL/L (ref 95–110)
CO2 SERPL-SCNC: 23 MMOL/L (ref 23–29)
CREAT SERPL-MCNC: 0.8 MG/DL (ref 0.5–1.4)
DHEA-S SERPL-MCNC: 408.4 UG/DL (ref 134.2–407.4)
EST. GFR  (NO RACE VARIABLE): >60 ML/MIN/1.73 M^2
GLUCOSE SERPL-MCNC: 89 MG/DL (ref 70–110)
POTASSIUM SERPL-SCNC: 4.6 MMOL/L (ref 3.5–5.1)
PROLACTIN SERPL IA-MCNC: 18.7 NG/ML (ref 5.2–26.5)
PROT SERPL-MCNC: 7.1 G/DL (ref 6–8.4)
SODIUM SERPL-SCNC: 138 MMOL/L (ref 136–145)
TESTOST SERPL-MCNC: 56 NG/DL (ref 5–73)
TSH SERPL DL<=0.005 MIU/L-ACNC: 1.46 UIU/ML (ref 0.4–4)
TSH SERPL DL<=0.005 MIU/L-ACNC: 1.46 UIU/ML (ref 0.4–4)

## 2024-04-15 ENCOUNTER — PATIENT MESSAGE (OUTPATIENT)
Dept: OBSTETRICS AND GYNECOLOGY | Facility: CLINIC | Age: 24
End: 2024-04-15
Payer: COMMERCIAL

## 2024-04-16 ENCOUNTER — PATIENT MESSAGE (OUTPATIENT)
Dept: OBSTETRICS AND GYNECOLOGY | Facility: CLINIC | Age: 24
End: 2024-04-16
Payer: COMMERCIAL

## 2024-04-17 ENCOUNTER — PATIENT MESSAGE (OUTPATIENT)
Dept: OBSTETRICS AND GYNECOLOGY | Facility: CLINIC | Age: 24
End: 2024-04-17
Payer: COMMERCIAL

## 2024-04-17 RX ORDER — CLOMIPHENE CITRATE 50 MG/1
50 TABLET ORAL DAILY
Qty: 5 TABLET | Refills: 0 | Status: SHIPPED | OUTPATIENT
Start: 2024-04-17 | End: 2024-04-17 | Stop reason: SDUPTHER

## 2024-04-17 RX ORDER — METFORMIN HYDROCHLORIDE 500 MG/1
500 TABLET ORAL NIGHTLY
Qty: 30 TABLET | Refills: 3 | Status: SHIPPED | OUTPATIENT
Start: 2024-04-17 | End: 2024-04-17 | Stop reason: SDUPTHER

## 2024-04-17 NOTE — TELEPHONE ENCOUNTER
----- Message from Sherrill Short sent at 4/17/2024  3:29 PM CDT -----  Type:  Needs Medical Advice/pharmacy change     Who Called: pt    Pharmacy name and phone #:  [facility]. - [address] [address]  Phone: [phone] Fax: [phone]  Hours: Not open 24 hours      Would the patient rather a call back or a response via [facility]? Call   Best Call Back Number: [phone]  Additional Information: changing medications to new location change metformin clomid.

## 2024-04-18 ENCOUNTER — PATIENT MESSAGE (OUTPATIENT)
Dept: OBSTETRICS AND GYNECOLOGY | Facility: CLINIC | Age: 24
End: 2024-04-18
Payer: COMMERCIAL

## 2024-04-18 RX ORDER — METFORMIN HYDROCHLORIDE 500 MG/1
500 TABLET ORAL NIGHTLY
Qty: 30 TABLET | Refills: 2 | Status: SHIPPED | OUTPATIENT
Start: 2024-04-18 | End: 2025-04-18

## 2024-04-18 RX ORDER — CLOMIPHENE CITRATE 50 MG/1
50 TABLET ORAL DAILY
Qty: 5 TABLET | Refills: 0 | Status: SHIPPED | OUTPATIENT
Start: 2024-04-18 | End: 2024-04-23

## 2024-04-22 RX ORDER — CLOMIPHENE CITRATE 50 MG/1
50 TABLET ORAL DAILY
Qty: 5 TABLET | Refills: 0 | Status: SHIPPED | OUTPATIENT
Start: 2024-04-22 | End: 2024-04-27

## 2024-04-22 RX ORDER — METFORMIN HYDROCHLORIDE 500 MG/1
500 TABLET ORAL NIGHTLY
Qty: 30 TABLET | Refills: 3 | Status: SHIPPED | OUTPATIENT
Start: 2024-04-22 | End: 2025-04-22

## 2024-04-24 LAB
HPV HR 12 DNA SPEC QL NAA+PROBE: NEGATIVE
HPV16 AG SPEC QL: NEGATIVE
HPV18 DNA SPEC QL NAA+PROBE: NEGATIVE

## 2024-04-25 ENCOUNTER — PATIENT MESSAGE (OUTPATIENT)
Dept: OBSTETRICS AND GYNECOLOGY | Facility: CLINIC | Age: 24
End: 2024-04-25
Payer: COMMERCIAL

## 2024-04-25 ENCOUNTER — TELEPHONE (OUTPATIENT)
Dept: OBSTETRICS AND GYNECOLOGY | Facility: CLINIC | Age: 24
End: 2024-04-25
Payer: COMMERCIAL

## 2024-04-29 ENCOUNTER — OFFICE VISIT (OUTPATIENT)
Dept: FAMILY MEDICINE | Facility: CLINIC | Age: 24
End: 2024-04-29
Payer: COMMERCIAL

## 2024-04-29 VITALS
SYSTOLIC BLOOD PRESSURE: 128 MMHG | BODY MASS INDEX: 35.68 KG/M2 | WEIGHT: 177 LBS | HEART RATE: 83 BPM | HEIGHT: 59 IN | OXYGEN SATURATION: 100 % | DIASTOLIC BLOOD PRESSURE: 73 MMHG | RESPIRATION RATE: 18 BRPM

## 2024-04-29 DIAGNOSIS — F41.9 ANXIETY: Primary | ICD-10-CM

## 2024-04-29 DIAGNOSIS — F51.01 PRIMARY INSOMNIA: ICD-10-CM

## 2024-04-29 DIAGNOSIS — F90.0 ATTENTION DEFICIT HYPERACTIVITY DISORDER (ADHD), PREDOMINANTLY INATTENTIVE TYPE: ICD-10-CM

## 2024-04-29 PROCEDURE — 1159F MED LIST DOCD IN RCRD: CPT | Mod: CPTII,S$GLB,, | Performed by: PHYSICIAN ASSISTANT

## 2024-04-29 PROCEDURE — 99214 OFFICE O/P EST MOD 30 MIN: CPT | Mod: S$GLB,,, | Performed by: PHYSICIAN ASSISTANT

## 2024-04-29 PROCEDURE — 3078F DIAST BP <80 MM HG: CPT | Mod: CPTII,S$GLB,, | Performed by: PHYSICIAN ASSISTANT

## 2024-04-29 PROCEDURE — 3074F SYST BP LT 130 MM HG: CPT | Mod: CPTII,S$GLB,, | Performed by: PHYSICIAN ASSISTANT

## 2024-04-29 PROCEDURE — 3008F BODY MASS INDEX DOCD: CPT | Mod: CPTII,S$GLB,, | Performed by: PHYSICIAN ASSISTANT

## 2024-04-29 RX ORDER — DEXTROAMPHETAMINE SACCHARATE, AMPHETAMINE ASPARTATE, DEXTROAMPHETAMINE SULFATE AND AMPHETAMINE SULFATE 7.5; 7.5; 7.5; 7.5 MG/1; MG/1; MG/1; MG/1
30 TABLET ORAL 2 TIMES DAILY
Qty: 60 TABLET | Refills: 0 | Status: SHIPPED | OUTPATIENT
Start: 2024-04-29 | End: 2024-05-29

## 2024-04-29 RX ORDER — DEXTROAMPHETAMINE SACCHARATE, AMPHETAMINE ASPARTATE, DEXTROAMPHETAMINE SULFATE AND AMPHETAMINE SULFATE 7.5; 7.5; 7.5; 7.5 MG/1; MG/1; MG/1; MG/1
30 TABLET ORAL 2 TIMES DAILY
Qty: 60 TABLET | Refills: 0 | Status: SHIPPED | OUTPATIENT
Start: 2024-06-29 | End: 2024-07-29

## 2024-04-29 RX ORDER — DEXTROAMPHETAMINE SACCHARATE, AMPHETAMINE ASPARTATE, DEXTROAMPHETAMINE SULFATE AND AMPHETAMINE SULFATE 7.5; 7.5; 7.5; 7.5 MG/1; MG/1; MG/1; MG/1
30 TABLET ORAL 2 TIMES DAILY
Qty: 60 TABLET | Refills: 0 | Status: SHIPPED | OUTPATIENT
Start: 2024-05-29 | End: 2024-06-28

## 2024-04-29 NOTE — PROGRESS NOTES
SUBJECTIVE:    Patient ID: Nasima Otoole is a 23 y.o. female.    Chief Complaint: Follow-up (Brought bottles// refills needed// pt states she's been having trouble falling asleep also states her anxiety lately has been getting worse going on for 5 months )    24 yo female presents for regular checkup and refills. New OBGYN- DR. Terrell. Working through PCOS and ovulation induction. Hoping for pregnancy in the next few years. Does have some difficulty sleeping. Shutting her mind off is a huge problem. Otc melatonin ineffective. Belsomra is not helping either. Mood does seem to be doing better with the addition of the metformin.    Follow-up  Associated symptoms include headaches and neck pain. Pertinent negatives include no arthralgias, chest pain, joint swelling, vomiting or weakness.       Lab Visit on 04/04/2024   Component Date Value Ref Range Status    Sodium 04/11/2024 138  136 - 145 mmol/L Final    Potassium 04/11/2024 4.6  3.5 - 5.1 mmol/L Final    Chloride 04/11/2024 106  95 - 110 mmol/L Final    CO2 04/11/2024 23  23 - 29 mmol/L Final    Glucose 04/11/2024 89  70 - 110 mg/dL Final    BUN 04/11/2024 11  6 - 20 mg/dL Final    Creatinine 04/11/2024 0.8  0.5 - 1.4 mg/dL Final    Calcium 04/11/2024 9.7  8.7 - 10.5 mg/dL Final    Total Protein 04/11/2024 7.1  6.0 - 8.4 g/dL Final    Albumin 04/11/2024 3.9  3.5 - 5.2 g/dL Final    Total Bilirubin 04/11/2024 0.6  0.1 - 1.0 mg/dL Final    Alkaline Phosphatase 04/11/2024 92  55 - 135 U/L Final    AST 04/11/2024 21  10 - 40 U/L Final    ALT 04/11/2024 17  10 - 44 U/L Final    eGFR 04/11/2024 >60.0  >60 mL/min/1.73 m^2 Final    Anion Gap 04/11/2024 9  8 - 16 mmol/L Final    WBC 04/11/2024 7.82  3.90 - 12.70 K/uL Final    RBC 04/11/2024 5.04  4.00 - 5.40 M/uL Final    Hemoglobin 04/11/2024 14.4  12.0 - 16.0 g/dL Final    Hematocrit 04/11/2024 43.4  37.0 - 48.5 % Final    MCV 04/11/2024 86  82 - 98 fL Final    MCH 04/11/2024 28.6  27.0 - 31.0 pg Final    MCHC  04/11/2024 33.2  32.0 - 36.0 g/dL Final    RDW 04/11/2024 13.1  11.5 - 14.5 % Final    Platelets 04/11/2024 306  150 - 450 K/uL Final    MPV 04/11/2024 10.5  9.2 - 12.9 fL Final    Immature Granulocytes 04/11/2024 0.3  0.0 - 0.5 % Final    Gran # (ANC) 04/11/2024 3.5  1.8 - 7.7 K/uL Final    Immature Grans (Abs) 04/11/2024 0.02  0.00 - 0.04 K/uL Final    Lymph # 04/11/2024 3.4  1.0 - 4.8 K/uL Final    Mono # 04/11/2024 0.6  0.3 - 1.0 K/uL Final    Eos # 04/11/2024 0.3  0.0 - 0.5 K/uL Final    Baso # 04/11/2024 0.03  0.00 - 0.20 K/uL Final    nRBC 04/11/2024 0  0 /100 WBC Final    Gran % 04/11/2024 44.6  38.0 - 73.0 % Final    Lymph % 04/11/2024 43.1  18.0 - 48.0 % Final    Mono % 04/11/2024 7.8  4.0 - 15.0 % Final    Eosinophil % 04/11/2024 3.8  0.0 - 8.0 % Final    Basophil % 04/11/2024 0.4  0.0 - 1.9 % Final    Differential Method 04/11/2024 Automated   Final    TSH 04/11/2024 1.461  0.400 - 4.000 uIU/mL Final    Prolactin 04/11/2024 18.7  5.2 - 26.5 ng/mL Final    TSH 04/11/2024 1.461  0.400 - 4.000 uIU/mL Final    Testosterone, Total 04/11/2024 56  5 - 73 ng/dL Final    DHEA-SO4 04/11/2024 408.4 (H)  134.2 - 407.4 ug/dL Final   Office Visit on 04/04/2024   Component Date Value Ref Range Status    Final Pathologic Diagnosis 04/04/2024  (A)   Final                    Value:Specimen Adequacy  Satisfactory for interpretation. Endocervical component is present.    [place] Category  Epithelial cell abnormalities.    Final Diagnostic Interpretation  Atypical squamous cells of undetermined significance (HPV testing will automatically occur on ThinPrep specimens with this diagnosis).      Disclaimer 04/04/2024  (A)   Final                    Value:The Pap smear is a screening test that aids in the detection of cervical cancer and cancer precursors. Both false positive and false negative results can occur. The test should be used at regular intervals, and positive results should be confirmed before   definitive  therapy.  This liquid based specimen is processed using the  or  Thin PrepPAP System. This specimen has been analyzed by the ThinPrep Imaging System ([employer]), an automated imaging and review system which assists the laboratory in evaluating   cells on ThinPrep PAP tests. Following automated imaging, selected fields from every slide are reviewed by a cytotechnologist and/or pathologist.     Screening was performed at [facility], [address], [place] [address].      HPV other High Risk types, PCR 04/04/2024 Negative  Negative Final    HPV High Risk type 16, PCR 04/04/2024 Negative  Negative Final    HPV High Risk type 18, PCR 04/04/2024 Negative  Negative Final   Office Visit on 01/03/2024   Component Date Value Ref Range Status    POC Blood, Urine 01/03/2024 Negative  Negative, Positive Slide, Positive Tube Final    POC Bilirubin, Urine 01/03/2024 Positive (A)  Negative, Positive Slide, Positive Tube Final    POC Urobilinogen, Urine 01/03/2024 2 (A)  0.1 - 1.1 Final    POC Ketones, Urine 01/03/2024 Positive (A)  Negative, Positive Slide, Positive Tube Final    POC Protein, Urine 01/03/2024 Negative  Negative, Positive Slide, Positive Tube Final    POC Nitrates, Urine 01/03/2024 Positive (A)  Negative, Positive Slide, Positive Tube Final    POC Glucose, Urine 01/03/2024 Negative  Negative, Positive Slide, Positive Tube Final    pH, UA 01/03/2024 5.0   Final    POC Specific Gravity, Urine 01/03/2024 1.025  1.003 - 1.029 Final    POC Leukocytes, Urine 01/03/2024 Positive (A)  Negative, Positive Slide, Positive Tube Final    POC Preg Test, Ur 01/03/2024 Negative  Negative Final     Acceptable 01/03/2024 Yes   Final    Urine Culture, Routine 01/03/2024 Final report (A)   Final    Result 1 01/03/2024 Candida tropicalis (A)   Final       Past Medical History:   Diagnosis Date    ADHD (attention deficit hyperactivity disorder)      Anxiety     Depression     Primary insomnia      Past Surgical History:   Procedure Laterality Date    APPENDECTOMY       Family History   Problem Relation Name Age of Onset    Cancer Mother      Alcohol abuse Father      Depression Father      No Known Problems Sister      Cancer Paternal Grandfather          prostate       Marital Status:   Alcohol History:  reports that she does not currently use alcohol.  Tobacco History:  reports that she has never smoked. She has never used smokeless tobacco.  Drug History:  reports no history of drug use.    Review of patient's allergies indicates:  No Known Allergies    Current Outpatient Medications:     metFORMIN (GLUCOPHAGE) 500 MG tablet, Take 1 tablet (500 mg total) by mouth every evening., Disp: 30 tablet, Rfl: 3    metFORMIN (GLUCOPHAGE) 500 MG tablet, Take 1 tablet (500 mg total) by mouth nightly., Disp: 30 tablet, Rfl: 2    metoprolol succinate (TOPROL-XL) 25 MG 24 hr tablet, Take 2 tablets (50 mg total) by mouth once daily., Disp: 180 tablet, Rfl: 1    dextroamphetamine-amphetamine 30 mg Tab, Take 1 tablet (30 mg total) by mouth 2 (two) times a day., Disp: 60 tablet, Rfl: 0    [START ON 5/29/2024] dextroamphetamine-amphetamine 30 mg Tab, Take 1 tablet (30 mg total) by mouth 2 (two) times a day., Disp: 60 tablet, Rfl: 0    [START ON 6/29/2024] dextroamphetamine-amphetamine 30 mg Tab, Take 1 tablet (30 mg total) by mouth 2 (two) times a day., Disp: 60 tablet, Rfl: 0    Review of Systems   Constitutional:  Negative for activity change and unexpected weight change.   HENT:  Negative for hearing loss, rhinorrhea and trouble swallowing.    Eyes:  Negative for discharge and visual disturbance.   Respiratory:  Negative for chest tightness and wheezing.    Cardiovascular:  Positive for palpitations. Negative for chest pain.   Gastrointestinal:  Negative for blood in stool, constipation, diarrhea and vomiting.   Endocrine: Negative for polydipsia and polyuria.  "  Genitourinary:  Positive for dysuria. Negative for difficulty urinating, hematuria and menstrual problem.   Musculoskeletal:  Positive for neck pain. Negative for arthralgias and joint swelling.   Neurological:  Positive for headaches. Negative for weakness.   Psychiatric/Behavioral:  Negative for confusion and dysphoric mood.           Objective:      Vitals:    04/29/24 0804   BP: 128/73   Pulse: 83   Resp: 18   SpO2: 100%   Weight: 80.3 kg (177 lb)   Height: 4' 11" (1.499 m)     Physical Exam  Constitutional:       General: She is not in acute distress.     Appearance: She is well-developed.   HENT:      Head: Normocephalic and atraumatic.   Eyes:      Conjunctiva/sclera: Conjunctivae normal.      Pupils: Pupils are equal, round, and reactive to light.   Neck:      Thyroid: No thyromegaly.   Cardiovascular:      Rate and Rhythm: Normal rate and regular rhythm.      Heart sounds: Normal heart sounds.   Pulmonary:      Effort: Pulmonary effort is normal.      Breath sounds: Normal breath sounds.   Abdominal:      General: Bowel sounds are normal. There is no distension.      Palpations: Abdomen is soft.      Tenderness: There is no abdominal tenderness.   Musculoskeletal:         General: Normal range of motion.      Cervical back: Normal range of motion and neck supple.   Skin:     General: Skin is warm and dry.      Findings: No erythema.   Neurological:      Mental Status: She is alert and oriented to person, place, and time.      Cranial Nerves: No cranial nerve deficit.           Assessment:       1. Anxiety    2. Attention deficit hyperactivity disorder (ADHD), predominantly inattentive type    3. Primary insomnia         Plan:       Anxiety  Comments:  overall, doing better. will let me know if returns and we could consider an alternative. keeping in mind that she is TTC    Attention deficit hyperactivity disorder (ADHD), predominantly inattentive type  Comments:  refills of medication now. aware that if " she does get pregnant she will need to stop this.  Orders:  -     dextroamphetamine-amphetamine 30 mg Tab; Take 1 tablet (30 mg total) by mouth 2 (two) times a day.  Dispense: 60 tablet; Refill: 0  -     dextroamphetamine-amphetamine 30 mg Tab; Take 1 tablet (30 mg total) by mouth 2 (two) times a day.  Dispense: 60 tablet; Refill: 0  -     dextroamphetamine-amphetamine 30 mg Tab; Take 1 tablet (30 mg total) by mouth 2 (two) times a day.  Dispense: 60 tablet; Refill: 0    Primary insomnia      Follow up in about 6 months (around 10/29/2024).        4/29/2024 Simone Crews PA-C

## 2024-05-31 ENCOUNTER — PATIENT MESSAGE (OUTPATIENT)
Dept: FAMILY MEDICINE | Facility: CLINIC | Age: 24
End: 2024-05-31
Payer: COMMERCIAL

## 2024-11-02 DIAGNOSIS — R00.2 PALPITATIONS: ICD-10-CM

## 2024-11-04 RX ORDER — METOPROLOL SUCCINATE 25 MG/1
50 TABLET, EXTENDED RELEASE ORAL
Qty: 180 TABLET | Refills: 1 | Status: SHIPPED | OUTPATIENT
Start: 2024-11-04

## 2024-11-15 ENCOUNTER — OFFICE VISIT (OUTPATIENT)
Dept: FAMILY MEDICINE | Facility: CLINIC | Age: 24
End: 2024-11-15
Payer: COMMERCIAL

## 2024-11-15 VITALS
OXYGEN SATURATION: 100 % | DIASTOLIC BLOOD PRESSURE: 64 MMHG | HEART RATE: 96 BPM | SYSTOLIC BLOOD PRESSURE: 118 MMHG | RESPIRATION RATE: 18 BRPM | WEIGHT: 198 LBS | BODY MASS INDEX: 39.92 KG/M2 | HEIGHT: 59 IN

## 2024-11-15 DIAGNOSIS — F90.0 ATTENTION DEFICIT HYPERACTIVITY DISORDER (ADHD), PREDOMINANTLY INATTENTIVE TYPE: Primary | ICD-10-CM

## 2024-11-15 DIAGNOSIS — F41.9 ANXIETY: ICD-10-CM
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
## 2025-02-13 ENCOUNTER — PATIENT MESSAGE (OUTPATIENT)
Dept: FAMILY MEDICINE | Facility: CLINIC | Age: 25
End: 2025-02-13
Payer: COMMERCIAL

## 2025-02-13 DIAGNOSIS — B37.0 THRUSH: Primary | ICD-10-CM

## 2025-02-13 DIAGNOSIS — B37.2 YEAST DERMATITIS: ICD-10-CM

## 2025-02-14 RX ORDER — FLUCONAZOLE 150 MG/1
TABLET ORAL
Qty: 2 TABLET | Refills: 0 | Status: SHIPPED | OUTPATIENT
Start: 2025-02-14

## 2025-02-26 ENCOUNTER — PATIENT MESSAGE (OUTPATIENT)
Dept: FAMILY MEDICINE | Facility: CLINIC | Age: 25
End: 2025-02-26
Payer: COMMERCIAL

## 2025-02-26 NOTE — TELEPHONE ENCOUNTER
Not following? Does baby have thrush again? Not sure why mom would be having an issue again? Might want to try antifungal nipple cream to apply after each feeding of baby. Could get otc lotrimin and apply to the nipple. I dont mind sending in another 2-3 fluconazole also.

## 2025-05-22 ENCOUNTER — OFFICE VISIT (OUTPATIENT)
Dept: FAMILY MEDICINE | Facility: CLINIC | Age: 25
End: 2025-05-22
Payer: COMMERCIAL

## 2025-05-22 VITALS
OXYGEN SATURATION: 99 % | SYSTOLIC BLOOD PRESSURE: 134 MMHG | HEART RATE: 68 BPM | BODY MASS INDEX: 38.71 KG/M2 | DIASTOLIC BLOOD PRESSURE: 86 MMHG | HEIGHT: 59 IN | WEIGHT: 192 LBS

## 2025-05-22 DIAGNOSIS — F41.9 ANXIETY: ICD-10-CM

## 2025-05-22 DIAGNOSIS — Z00.00 ROUTINE PHYSICAL EXAMINATION: ICD-10-CM

## 2025-05-22 DIAGNOSIS — F90.0 ATTENTION DEFICIT HYPERACTIVITY DISORDER (ADHD), PREDOMINANTLY INATTENTIVE TYPE: Primary | ICD-10-CM

## 2025-05-22 DIAGNOSIS — E66.01 SEVERE OBESITY (BMI 35.0-39.9) WITH COMORBIDITY: ICD-10-CM

## 2025-05-22 DIAGNOSIS — R00.2 PALPITATIONS: ICD-10-CM

## 2025-05-22 NOTE — PROGRESS NOTES
SUBJECTIVE:    Patient ID: Nasima Otoole is a 24 y.o. female.    Chief Complaint: Hypertension (No bottle, no complaints, patient is fasting for blood work, abc )    History of Present Illness    CHIEF COMPLAINT:  Nasima presents today for follow up    MENTAL HEALTH:  She has a history of ADHD requiring medication and previous SSRI use which she discontinued. She reports worsening anxiety symptoms, specifically situational anxiety triggered by rain and wind, with catastrophic thoughts about tornados during windy conditions.    OBSTETRIC HISTORY:  She delivered at [facility]. Her baby had low blood sugar after delivery, requiring NICU admission and formula feeding. She had gestational diabetes monitoring during pregnancy and was advised to undergo glucose testing post-pregnancy to assess for diabetes, but experienced a change in OB providers during her pregnancy care.    CURRENT STATUS:  She is currently breastfeeding but plans to discontinue soon to resume her medications. She reports breastfeeding has been challenging. She has been non-adherent with metoprolol succinate 25 mg, indicating several days have elapsed since last dose.      ROS:  General: -fever, -chills, -fatigue, -weight gain, -weight loss  Eyes: -vision changes, -redness, -discharge  ENT: -ear pain, -nasal congestion, -sore throat  Cardiovascular: -chest pain, +palpitations, -lower extremity edema  Respiratory: -cough, -shortness of breath  Gastrointestinal: -abdominal pain, -nausea, -vomiting, -diarrhea, -constipation, -blood in stool  Genitourinary: -dysuria, -hematuria, -frequency  Musculoskeletal: -joint pain, -muscle pain  Skin: -rash, -lesion  Neurological: -headache, -dizziness, -numbness, -tingling  Psychiatric: +anxiety, -depression, -sleep difficulty, +feelings of impending doom, +excessive worry         No visits with results within 6 Month(s) from this visit.   Latest known visit with results is:   Admission on 11/14/2024, Discharged on  11/14/2024   Component Date Value Ref Range Status    WBC 11/14/2024 11.85  3.90 - 12.70 K/uL Final    RBC 11/14/2024 3.94 (L)  4.00 - 5.40 M/uL Final    Hemoglobin 11/14/2024 10.9 (L)  12.0 - 16.0 g/dL Final    Hematocrit 11/14/2024 32.7 (L)  37.0 - 48.5 % Final    MCV 11/14/2024 83  82 - 98 fL Final    MCH 11/14/2024 27.7  27.0 - 31.0 pg Final    MCHC 11/14/2024 33.3  32.0 - 36.0 g/dL Final    RDW 11/14/2024 12.6  11.5 - 14.5 % Final    Platelets 11/14/2024 276  150 - 450 K/uL Final    MPV 11/14/2024 9.1 (L)  9.2 - 12.9 fL Final    Immature Granulocytes 11/14/2024 0.8 (H)  0.0 - 0.5 % Final    Gran # (ANC) 11/14/2024 8.7 (H)  1.8 - 7.7 K/uL Final    Immature Grans (Abs) 11/14/2024 0.10 (H)  0.00 - 0.04 K/uL Final    Lymph # 11/14/2024 2.4  1.0 - 4.8 K/uL Final    Mono # 11/14/2024 0.6  0.3 - 1.0 K/uL Final    Eos # 11/14/2024 0.1  0.0 - 0.5 K/uL Final    Baso # 11/14/2024 0.03  0.00 - 0.20 K/uL Final    nRBC 11/14/2024 0  0 /100 WBC Final    Gran % 11/14/2024 73.7 (H)  38.0 - 73.0 % Final    Lymph % 11/14/2024 19.9  18.0 - 48.0 % Final    Mono % 11/14/2024 4.6  4.0 - 15.0 % Final    Eosinophil % 11/14/2024 0.7  0.0 - 8.0 % Final    Basophil % 11/14/2024 0.3  0.0 - 1.9 % Final    Differential Method 11/14/2024 Automated   Final    PT 11/14/2024 12.5  11.8 - 14.7 sec Final    INR 11/14/2024 1.0   Final    aPTT 11/14/2024 25.2  24.6 - 36.7 sec Final    Fibrinogen 11/14/2024 606 (H)  234 - 538 mg/dL Final       Past Medical History:   Diagnosis Date    ADHD (attention deficit hyperactivity disorder)     Anxiety     Depression     Personal history of gestational diabetes     diet controlled    Primary insomnia      Past Surgical History:   Procedure Laterality Date    APPENDECTOMY       Family History   Problem Relation Name Age of Onset    Cancer Mother      Alcohol abuse Father      Depression Father      No Known Problems Sister      Cancer Paternal Grandfather          prostate       Marital Status:  "  Alcohol History:  reports that she does not currently use alcohol.  Tobacco History:  reports that she has never smoked. She has never used smokeless tobacco.  Drug History:  reports no history of drug use.    Review of patient's allergies indicates:  No Known Allergies  Current Medications[1]    Objective:      Vitals:    05/22/25 0836   BP: 134/86   Pulse: 68   SpO2: 99%   Weight: 87.1 kg (192 lb)   Height: 4' 11" (1.499 m)     Physical Exam    Vitals: Blood pressure: 134/86.  General: No acute distress. Well-developed. Well-nourished.  Eyes: EOMI. Sclerae anicteric.  HENT: Normocephalic. Atraumatic. Nares patent. Moist oral mucosa.  Ears: Bilateral TMs clear. Bilateral EACs clear.  Cardiovascular: Regular rate. Regular rhythm. No murmurs. No rubs. No gallops. Normal S1, S2.  Respiratory: Normal respiratory effort. Clear to auscultation bilaterally. No rales. No rhonchi. No wheezing.  Abdomen: Soft. Non-tender. Non-distended. Normoactive bowel sounds.  Musculoskeletal: No  obvious deformity.  Extremities: No lower extremity edema.  Neurological: Alert & oriented x3. No slurred speech. Normal gait.  Psychiatric: Normal mood. Normal affect. Good insight. Good judgment.  Skin: Warm. Dry. No rash.         Assessment:       Assessment & Plan    - Assessed postpartum status at 4 months, including gestational diabetes resolution.  - Evaluated need for BP medication (metoprolol) continuation.  - Plan to review lab results to ensure normalization of pregnancy-related changes, including glucose levels, lipids, and thyroid function.    ATTENTION-DEFICIT HYPERACTIVITY DISORDER (ADHD):  - Discussed ADHD medication management post-breastfeeding.  - Nasima to notify when breastfeeding concludes to restart ADHD medication.    ANXIETY (ENVIRONMENTAL PHOBIA):  - Evaluated patient's situational anxiety related to weather conditions such as rain and wind.  - Discussed potential need for medication if anxiety becomes " debilitating.  - Considered PRN medication for acute anxiety post-breastfeeding.  - Nasima advised to contact office if wanting to start anxiety medication before the 6-month follow-up.    HISTORY OF GESTATIONAL DIABETES:  - Evaluated history of gestational diabetes and sugar monitoring during pregnancy.  - Ordered comprehensive lab panel including A1C, lipid panel, TSH, CMP, H&H, and urinalysis to ensure sugar levels have returned to normal post-delivery.  - Discontinued metformin.    SELECTIVE SEROTONIN REUPTAKE INHIBITORS (SSRI) MANAGEMENT:  - Nasima stopped taking SSRI and is uncertain about current need.  - Discussed potential resumption if anxiety becomes debilitating.    MEDICATION ADHERENCE:  - Nasima reports difficulty remembering to take Metoprolol succinate 25 mg for blood pressure.    FOLLOW-UP:  - Follow up in 6 months.  - Nasima to message for any urgent concerns or when breastfeeding concludes to address medication management for ADHD and possibly anxiety.       Plan:       Attention deficit hyperactivity disorder (ADHD), predominantly inattentive type    Anxiety    Palpitations    Routine physical examination  -     CBC W/ AUTO DIFFERENTIAL; Future; Expected date: 05/22/2025  -     COMPREHENSIVE METABOLIC PANEL; Future; Expected date: 05/22/2025  -     Lipid Panel; Future; Expected date: 05/22/2025  -     TSH w/reflex to FT4; Future; Expected date: 05/22/2025  -     Urinalysis, Reflex to Urine Culture Urine, Clean Catch; Future; Expected date: 05/22/2025  -     HEMOGLOBIN A1C; Future; Expected date: 05/22/2025    Severe obesity (BMI 35.0-39.9) with comorbidity      Follow up in about 6 months (around 11/22/2025).    This note was generated with the assistance of ambient listening technology. Verbal consent was obtained by the patient and accompanying visitor(s) for the recording of patient appointment to facilitate this note. I attest to having reviewed and edited the generated note for accuracy,  though some syntax or spelling errors may persist. Please contact the author of this note for any clarification.          5/22/2025 Simone Crews PA-C      Answers submitted by the patient for this visit:  Review of Systems Questionnaire (Submitted on 5/21/2025)  activity change: No  unexpected weight change: No  neck pain: No  hearing loss: No  rhinorrhea: No  trouble swallowing: No  eye discharge: No  visual disturbance: No  chest tightness: No  wheezing: No  chest pain: No  palpitations: No  blood in stool: No  constipation: No  vomiting: No  diarrhea: No  polydipsia: No  polyuria: No  difficulty urinating: No  hematuria: No  menstrual problem: No  dysuria: No  joint swelling: No  arthralgias: No  headaches: No  weakness: No  confusion: No  dysphoric mood: No         [1]   Current Outpatient Medications:     metoprolol succinate (TOPROL-XL) 25 MG 24 hr tablet, TAKE 2 TABLETS BY MOUTH ONCE DAILY., Disp: 180 tablet, Rfl: 1

## 2025-05-23 LAB
ALBUMIN SERPL-MCNC: 4.3 G/DL (ref 3.6–5.1)
ALBUMIN/GLOB SERPL: 1.6 (CALC) (ref 1–2.5)
ALP SERPL-CCNC: 120 U/L (ref 31–125)
ALT SERPL-CCNC: 18 U/L (ref 6–29)
AST SERPL-CCNC: 15 U/L (ref 10–30)
BASOPHILS # BLD AUTO: 7 CELLS/UL (ref 0–200)
BASOPHILS NFR BLD AUTO: 0.1 %
BILIRUB SERPL-MCNC: 0.7 MG/DL (ref 0.2–1.2)
BUN SERPL-MCNC: 13 MG/DL (ref 7–25)
BUN/CREAT SERPL: NORMAL (CALC) (ref 6–22)
CALCIUM SERPL-MCNC: 9.1 MG/DL (ref 8.6–10.2)
CHLORIDE SERPL-SCNC: 104 MMOL/L (ref 98–110)
CHOLEST SERPL-MCNC: 211 MG/DL
CHOLEST/HDLC SERPL: 4.5 (CALC)
CO2 SERPL-SCNC: 27 MMOL/L (ref 20–32)
CREAT SERPL-MCNC: 0.71 MG/DL (ref 0.5–0.96)
EGFR: 123 ML/MIN/1.73M2
EOSINOPHIL # BLD AUTO: 109 CELLS/UL (ref 15–500)
EOSINOPHIL NFR BLD AUTO: 1.6 %
ERYTHROCYTE [DISTWIDTH] IN BLOOD BY AUTOMATED COUNT: 17.1 % (ref 11–15)
GLOBULIN SER CALC-MCNC: 2.7 G/DL (CALC) (ref 1.9–3.7)
GLUCOSE SERPL-MCNC: 85 MG/DL (ref 65–99)
HBA1C MFR BLD: 5.8 %
HCT VFR BLD AUTO: 40.7 % (ref 35–45)
HDLC SERPL-MCNC: 47 MG/DL
HGB BLD-MCNC: 12.4 G/DL (ref 11.7–15.5)
LDLC SERPL CALC-MCNC: 128 MG/DL (CALC)
LYMPHOCYTES # BLD AUTO: 2856 CELLS/UL (ref 850–3900)
LYMPHOCYTES NFR BLD AUTO: 42 %
MCH RBC QN AUTO: 24 PG (ref 27–33)
MCHC RBC AUTO-ENTMCNC: 30.5 G/DL (ref 32–36)
MCV RBC AUTO: 78.9 FL (ref 80–100)
MONOCYTES # BLD AUTO: 428 CELLS/UL (ref 200–950)
MONOCYTES NFR BLD AUTO: 6.3 %
NEUTROPHILS # BLD AUTO: 3400 CELLS/UL (ref 1500–7800)
NEUTROPHILS NFR BLD AUTO: 50 %
NONHDLC SERPL-MCNC: 164 MG/DL (CALC)
PLATELET # BLD AUTO: 314 THOUSAND/UL (ref 140–400)
PMV BLD REES-ECKER: 9.4 FL (ref 7.5–12.5)
POTASSIUM SERPL-SCNC: 4.4 MMOL/L (ref 3.5–5.3)
PROT SERPL-MCNC: 7 G/DL (ref 6.1–8.1)
RBC # BLD AUTO: 5.16 MILLION/UL (ref 3.8–5.1)
SODIUM SERPL-SCNC: 140 MMOL/L (ref 135–146)
TRIGL SERPL-MCNC: 225 MG/DL
TSH SERPL-ACNC: 1.1 MIU/L
WBC # BLD AUTO: 6.8 THOUSAND/UL (ref 3.8–10.8)

## 2025-05-27 ENCOUNTER — RESULTS FOLLOW-UP (OUTPATIENT)
Dept: FAMILY MEDICINE | Facility: CLINIC | Age: 25
End: 2025-05-27

## 2025-06-03 ENCOUNTER — TELEPHONE (OUTPATIENT)
Dept: FAMILY MEDICINE | Facility: CLINIC | Age: 25
End: 2025-06-03
Payer: COMMERCIAL

## 2025-06-04 ENCOUNTER — OFFICE VISIT (OUTPATIENT)
Dept: URGENT CARE | Facility: CLINIC | Age: 25
End: 2025-06-04
Payer: COMMERCIAL

## 2025-06-04 VITALS
BODY MASS INDEX: 37.7 KG/M2 | TEMPERATURE: 98 F | SYSTOLIC BLOOD PRESSURE: 143 MMHG | DIASTOLIC BLOOD PRESSURE: 88 MMHG | HEART RATE: 68 BPM | WEIGHT: 187 LBS | HEIGHT: 59 IN | RESPIRATION RATE: 16 BRPM | OXYGEN SATURATION: 97 %

## 2025-06-04 DIAGNOSIS — R05.9 COUGH, UNSPECIFIED TYPE: Primary | ICD-10-CM

## 2025-06-04 DIAGNOSIS — J06.9 UPPER RESPIRATORY TRACT INFECTION, UNSPECIFIED TYPE: ICD-10-CM

## 2025-06-04 LAB
CTP QC/QA: YES
CTP QC/QA: YES
FLUAV AG NPH QL: NEGATIVE
FLUBV AG NPH QL: NEGATIVE
SARS-COV+SARS-COV-2 AG RESP QL IA.RAPID: NEGATIVE

## 2025-06-04 RX ORDER — AMOXICILLIN 500 MG/1
500 TABLET, FILM COATED ORAL EVERY 12 HOURS
Qty: 20 TABLET | Refills: 0 | Status: SHIPPED | OUTPATIENT
Start: 2025-06-04 | End: 2025-06-14

## 2025-07-22 ENCOUNTER — PATIENT MESSAGE (OUTPATIENT)
Dept: FAMILY MEDICINE | Facility: CLINIC | Age: 25
End: 2025-07-22
Payer: COMMERCIAL

## 2025-07-22 DIAGNOSIS — F41.9 ANXIETY: ICD-10-CM

## 2025-07-22 DIAGNOSIS — F90.0 ATTENTION DEFICIT HYPERACTIVITY DISORDER (ADHD), PREDOMINANTLY INATTENTIVE TYPE: Primary | ICD-10-CM

## 2025-07-23 RX ORDER — BUSPIRONE HYDROCHLORIDE 7.5 MG/1
7.5 TABLET ORAL 3 TIMES DAILY PRN
Qty: 90 TABLET | Refills: 2 | Status: SHIPPED | OUTPATIENT
Start: 2025-07-23 | End: 2026-07-23

## 2025-07-23 RX ORDER — DEXTROAMPHETAMINE SACCHARATE, AMPHETAMINE ASPARTATE, DEXTROAMPHETAMINE SULFATE AND AMPHETAMINE SULFATE 7.5; 7.5; 7.5; 7.5 MG/1; MG/1; MG/1; MG/1
30 TABLET ORAL 2 TIMES DAILY
Qty: 60 TABLET | Refills: 0 | Status: SHIPPED | OUTPATIENT
Start: 2025-07-23

## 2025-07-23 NOTE — TELEPHONE ENCOUNTER
Adderall 30 mg twice daily, pended to provider   Said did not want the SSRI, last visit discussed a medication for situational anxiety only, asked about hydroxizine, said too drowsy  Pended buspar?

## 2025-07-23 NOTE — TELEPHONE ENCOUNTER
I think we can start back both of these no problem! Lets just confirm pharmacy and we can get her started back

## 2025-08-30 DIAGNOSIS — F90.0 ATTENTION DEFICIT HYPERACTIVITY DISORDER (ADHD), PREDOMINANTLY INATTENTIVE TYPE: ICD-10-CM

## 2025-09-02 RX ORDER — DEXTROAMPHETAMINE SACCHARATE, AMPHETAMINE ASPARTATE, DEXTROAMPHETAMINE SULFATE AND AMPHETAMINE SULFATE 7.5; 7.5; 7.5; 7.5 MG/1; MG/1; MG/1; MG/1
30 TABLET ORAL 2 TIMES DAILY
Qty: 60 TABLET | Refills: 0 | Status: SHIPPED | OUTPATIENT
Start: 2025-09-02